# Patient Record
Sex: MALE | Race: WHITE | NOT HISPANIC OR LATINO | Employment: FULL TIME | URBAN - METROPOLITAN AREA
[De-identification: names, ages, dates, MRNs, and addresses within clinical notes are randomized per-mention and may not be internally consistent; named-entity substitution may affect disease eponyms.]

---

## 2023-09-27 ENCOUNTER — HOSPITAL ENCOUNTER (EMERGENCY)
Facility: HOSPITAL | Age: 73
Discharge: HOME | End: 2023-09-27
Attending: EMERGENCY MEDICINE
Payer: OTHER MISCELLANEOUS

## 2023-09-27 ENCOUNTER — APPOINTMENT (OUTPATIENT)
Dept: RADIOLOGY | Facility: HOSPITAL | Age: 73
End: 2023-09-27
Payer: OTHER MISCELLANEOUS

## 2023-09-27 ENCOUNTER — APPOINTMENT (EMERGENCY)
Dept: RADIOLOGY | Facility: HOSPITAL | Age: 73
End: 2023-09-27
Payer: OTHER MISCELLANEOUS

## 2023-09-27 VITALS
DIASTOLIC BLOOD PRESSURE: 79 MMHG | OXYGEN SATURATION: 96 % | BODY MASS INDEX: 35 KG/M2 | TEMPERATURE: 98 F | SYSTOLIC BLOOD PRESSURE: 163 MMHG | WEIGHT: 250 LBS | HEART RATE: 63 BPM | RESPIRATION RATE: 18 BRPM | HEIGHT: 71 IN

## 2023-09-27 DIAGNOSIS — S43.004A DISLOCATION OF RIGHT SHOULDER JOINT, INITIAL ENCOUNTER: Primary | ICD-10-CM

## 2023-09-27 PROCEDURE — 73110 X-RAY EXAM OF WRIST: CPT

## 2023-09-27 PROCEDURE — 99285 EMERGENCY DEPT VISIT HI MDM: CPT | Performed by: EMERGENCY MEDICINE

## 2023-09-27 PROCEDURE — 73020 X-RAY EXAM OF SHOULDER: CPT

## 2023-09-27 PROCEDURE — 99283 EMERGENCY DEPT VISIT LOW MDM: CPT

## 2023-09-27 PROCEDURE — 23650 CLTX SHO DSLC W/MNPJ WO ANES: CPT | Performed by: EMERGENCY MEDICINE

## 2023-09-27 PROCEDURE — 73030 X-RAY EXAM OF SHOULDER: CPT

## 2023-09-27 PROCEDURE — 99152 MOD SED SAME PHYS/QHP 5/>YRS: CPT | Performed by: EMERGENCY MEDICINE

## 2023-09-27 RX ORDER — ETOMIDATE 2 MG/ML
10 INJECTION INTRAVENOUS ONCE
Status: COMPLETED | OUTPATIENT
Start: 2023-09-27 | End: 2023-09-27

## 2023-09-27 RX ORDER — PROPOFOL 10 MG/ML
100 INJECTION, EMULSION INTRAVENOUS ONCE
Status: COMPLETED | OUTPATIENT
Start: 2023-09-27 | End: 2023-09-27

## 2023-09-27 RX ORDER — HYDROCODONE BITARTRATE AND ACETAMINOPHEN 5; 325 MG/1; MG/1
1 TABLET ORAL EVERY 6 HOURS PRN
Qty: 20 TABLET | Refills: 0 | Status: SHIPPED | OUTPATIENT
Start: 2023-09-27

## 2023-09-27 RX ORDER — PROPOFOL 10 MG/ML
INJECTION, EMULSION INTRAVENOUS
Status: COMPLETED
Start: 2023-09-27 | End: 2023-09-27

## 2023-09-27 RX ADMIN — PROPOFOL 100 MG: 10 INJECTION, EMULSION INTRAVENOUS at 18:28

## 2023-09-27 RX ADMIN — ETOMIDATE 10 MG: 2 INJECTION INTRAVENOUS at 18:22

## 2023-09-27 RX ADMIN — ETOMIDATE 10 MG: 2 INJECTION INTRAVENOUS at 18:18

## 2023-09-27 RX ADMIN — PROPOFOL 100 MG: 10 INJECTION, EMULSION INTRAVENOUS at 18:19

## 2023-09-27 NOTE — ED PROVIDER NOTES
History  Chief Complaint   Patient presents with   • Shoulder Injury     Tripped from standing, landing on L shoulder onto a steel rack and press. Reports arm got stuck in upward position prior to falling again. PNS intact. Shoulder deformity noted. Also c/o L wrist pain. 82 yo male with sudden onset R shoulder pain after trip and fall and tried to catch self with outstretched and flexed and abducted RUE. Unable to move shoulder due to pain. Also c/o acute on chronic mild L wrist pain. No head strike or headache or neurologic complaints. No neck pain. No cp, sob, abd pain, back pain or lower extremity pain. Additional history from daughter at bedside. None       Past Medical History:   Diagnosis Date   • CAD (coronary artery disease)    • CHF (congestive heart failure) ([address])    • Heart attack ([address])     2016   • Hypertension        Past Surgical History:   Procedure Laterality Date   • APPENDECTOMY     • COSMETIC SURGERY      "face re built"   • HERNIA REPAIR         History reviewed. No pertinent family history. I have reviewed and agree with the history as documented. E-Cigarette/Vaping     E-Cigarette/Vaping Substances     Social History     Tobacco Use   • Smoking status: Never   • Smokeless tobacco: Never   Substance Use Topics   • Alcohol use: Never   • Drug use: Never       Review of Systems   Musculoskeletal: Positive for arthralgias. Physical Exam  Physical Exam  Vitals and nursing note reviewed. Constitutional:       General: He is not in acute distress. Appearance: Normal appearance. He is well-developed. He is not ill-appearing, toxic-appearing or diaphoretic. HENT:      Head: Normocephalic and atraumatic. Eyes:      Conjunctiva/sclera: Conjunctivae normal.      Pupils: Pupils are equal, round, and reactive to light. Neck:      Vascular: No JVD. Cardiovascular:      Rate and Rhythm: Normal rate and regular rhythm. Heart sounds: Normal heart sounds.  No murmur heard.     No friction rub. No gallop. Pulmonary:      Effort: Pulmonary effort is normal. No respiratory distress. Breath sounds: Normal breath sounds. No stridor. No wheezing or rales. Abdominal:      General: There is no distension. Palpations: Abdomen is soft. Tenderness: There is no abdominal tenderness. There is no guarding or rebound. Musculoskeletal:         General: Deformity present. No tenderness. Cervical back: Normal range of motion and neck supple. No rigidity or tenderness. Comments: R shoulder dislocated anteriorly. Skin:     General: Skin is warm and dry. Capillary Refill: Capillary refill takes less than 2 seconds. Coloration: Skin is not jaundiced or pale. Findings: No bruising, erythema, lesion or rash. Neurological:      General: No focal deficit present. Mental Status: He is alert and oriented to person, place, and time. Cranial Nerves: No cranial nerve deficit. Sensory: No sensory deficit. Motor: No weakness or abnormal muscle tone.       Coordination: Coordination normal.      Gait: Gait normal.         Vital Signs  ED Triage Vitals   Temperature Pulse Respirations Blood Pressure SpO2   09/27/23 1603 09/27/23 1603 09/27/23 1603 09/27/23 1603 09/27/23 1603   (!) 97.1 °F (36.2 °C) 69 18 160/75 97 %      Temp Source Heart Rate Source Patient Position - Orthostatic VS BP Location FiO2 (%)   09/27/23 1914 09/27/23 1817 09/27/23 1817 09/27/23 1817 --   Oral Monitor Lying Left arm       Pain Score       09/27/23 1608       7           Vitals:    09/27/23 1845 09/27/23 1850 09/27/23 1855 09/27/23 1900   BP: (!) 181/81 160/61 164/78 163/79   Pulse: 73 72 75 63   Patient Position - Orthostatic VS: Lying   Sitting         Visual Acuity  Visual Acuity    Flowsheet Row Most Recent Value   L Pupil Size (mm) 3   R Pupil Size (mm) 3          ED Medications  Medications   etomidate (AMIDATE) 2 mg/mL injection 10 mg (10 mg Intravenous Given 9/27/23 1818)   etomidate (AMIDATE) 2 mg/mL injection 10 mg (10 mg Intravenous Given 9/27/23 1822)   propofol (DIPRIVAN) 200 MG/20ML bolus injection 100 mg (100 mg Intravenous Given by Other 9/27/23 1828)   propofol (DIPRIVAN) 200 MG/20ML bolus injection 100 mg (100 mg Intravenous Given 9/27/23 1819)       Diagnostic Studies  Results Reviewed     None                 XR shoulder 1 vw RIGHT POST REDUCTION   ED Interpretation by Benita Mack MD (09/27 1853)   Successful reduction. XR shoulder 2+ views RIGHT   ED Interpretation by Benita Mack MD (09/27 1653)   Anterior dislocation. XR wrist 3+ views LEFT   ED Interpretation by Benita Mack MD (09/27 1653)   Normal study.                   Procedures  Pre-Procedural Sedation    Performed by: Benita Mack MD  Authorized by: Benita Mack MD    Consent:     Consent obtained:  Verbal    Consent given by:  Patient    Alternatives discussed:  Analgesia without sedation, anxiolysis and regional anesthesia  Indications:     Procedure necessitating sedation performed by:  Physician performing sedation    Intended level of sedation:  Moderate (conscious sedation)  Pre-sedation assessment:     NPO status caution: unable to specify NPO status      Neck mobility: normal      Mallampati score:  II - soft palate, uvula, fauces visible    Pre-sedation assessments completed and reviewed: airway patency, cardiovascular function, hydration status, mental status, nausea/vomiting, pain level and respiratory function      History of difficult intubation: no      Pre-sedation assessment completed:  9/27/2023 4:55 PM    Procedural Sedation    Date/Time: 9/27/2023 6:16 PM    Performed by: Benita Mack MD  Authorized by: Benita Mack MD    Immediate pre-procedure details:     Reassessment: Patient reassessed immediately prior to procedure      Reviewed: vital signs and relevant labs/tests      Verified: bag valve mask available, emergency equipment available, intubation equipment available, IV patency confirmed, oxygen available and suction available    Procedure details (see MAR for exact dosages):     Sedation start time:  9/27/2023 6:16 PM    Preoxygenation:  Nasal cannula    Sedation:  Propofol    Intra-procedure monitoring:  Blood pressure monitoring, cardiac monitor, continuous pulse oximetry, continuous capnometry, frequent LOC assessments and frequent vital sign checks    Intra-procedure events: none      Sedation end time:  9/27/2023 6:31 PM    Total sedation time (minutes):  15  Post-procedure details:     Post-sedation assessment completed:  9/27/2023 6:38 PM    Attendance: Constant attendance by certified staff until patient recovered      Recovery: Patient returned to pre-procedure baseline      Post-sedation assessments completed and reviewed: airway patency, cardiovascular function, hydration status, mental status, nausea/vomiting and respiratory function      Patient tolerance: Tolerated well, no immediate complications  Orthopedic injury treatment    Date/Time: 9/27/2023 6:39 PM    Performed by: Diana Lopez MD  Authorized by: Diana Lopez MD    Patient Location:  ED  [place] Protocol:  Consent: Verbal consent obtained. Risks and benefits: risks, benefits and alternatives were discussed  Consent given by: patient      Injury location:  Shoulder  Neurovascular status: Neurovascularly intact    Distal perfusion: normal    Neurological function: normal    Range of motion: reduced    Sedation type:   Moderate (conscious) sedation (See separate Procedural Sedation form)  Skeletal traction used?: Yes    Immobilization:  Sling  Neurovascular status: Neurovascularly intact    Distal perfusion: normal    Neurological function: normal    Range of motion: normal    Patient tolerance:  Patient tolerated the procedure well with no immediate complications      Conscious Sedation Assessment    Flowsheet Row Classification Score   ASA Scale Assessment 2-Mild to moderate systemic disease, medically well controlled, with no functional limitation filed at 09/27/2023 1817             ED Course  ED Course as of 09/27/23 2046   Wed Sep 27, 2023   1852 Reexamined. Awake. Alert. Notes significant improvement in R shoulder pain. 8224 I have reasonably determined that electronically prescribing a controlled substance would be impractical for the patient to obtain the controlled substance prescribed by electronic prescription or would cause an untimely delay resulting in an adverse impact on the patient's medical condition. Identification of Seniors at [facility] Most Recent Value   (ISAR) Identification of Seniors at Risk    Before the illness or injury that brought you to the Emergency, did you need someone to help you on a regular basis? 0 Filed at: 09/27/2023 1607   In the last 24 hours, have you needed more help than usual? 0 Filed at: 09/27/2023 1607   Have you been hospitalized for one or more nights during the past 6 months? 0 Filed at: 09/27/2023 1607   In general, do you see well? 0 Filed at: 09/27/2023 1607   In general, do you have serious problems with your memory? 0 Filed at: 09/27/2023 1607   Do you take more than three different medications every day? 0 Filed at: 09/27/2023 1607   ISAR Score 0 Filed at: 09/27/2023 1607                      SBIRT 20yo+    Flowsheet Row Most Recent Value   Initial Alcohol Screen: US AUDIT-C     1. How often do you have a drink containing alcohol? 0 Filed at: 09/27/2023 1854   2. How many drinks containing alcohol do you have on a typical day you are drinking? 0 Filed at: 09/27/2023 1854   3a. Male UNDER 65: How often do you have five or more drinks on one occasion? 0 Filed at: 09/27/2023 1608   3b. FEMALE Any Age, or MALE 65+: How often do you have 4 or more drinks on one occassion? 0 Filed at: 09/27/2023 1854   Audit-C Score 0 Filed at: 09/27/2023 1854   SOPHIE: How many times in the past year have you. ..     Used an illegal drug or used a prescription medication for non-medical reasons? Never Filed at: 09/27/2023 1854                    MDM    Disposition  Final diagnoses:   Dislocation of right shoulder joint, initial encounter     Time reflects when diagnosis was documented in both MDM as applicable and the Disposition within this note     Time User Action Codes Description Comment    9/27/2023  6:53 PM Oscar Preston Add [S43.004A] Dislocation of right shoulder joint, initial encounter       ED Disposition     ED Disposition   Discharge    Condition   Stable    Date/Time   Wed Sep 27, 2023  6:53 PM    [facility] discharge to home/self care.                Follow-up Information    None         Discharge Medication List as of 9/27/2023  6:54 PM      START taking these medications    Details   HYDROcodone-acetaminophen (Norco) 5-325 mg per tablet Take 1 tablet by mouth every 6 (six) hours as needed for pain for up to 20 doses Max Daily Amount: 4 tablets, Starting Wed 9/27/2023, Print                 PDMP Review     None          ED Provider  Electronically Signed by           Marychuy Gerber MD  09/27/23 2046

## 2023-09-27 NOTE — Clinical Note
Rigoberto Sears was seen and treated in our emergency department on 9/27/2023. Occupational Medicine Follow Up Within 24 hours    No work until cleared by Family Doctor/Orthopedics        Diagnosis: R shoulder dislocation    Tory Barrier  may return to gym class or sports after being cleared by physician. He may return on this date: If you have any questions or concerns, please don't hesitate to call.       Dedra Silver RN    ______________________________           _______________          _______________  [facility] Representative                              Date                                Time

## 2023-10-05 ENCOUNTER — APPOINTMENT (OUTPATIENT)
Dept: RADIOLOGY | Facility: CLINIC | Age: 73
End: 2023-10-05
Payer: COMMERCIAL

## 2023-10-05 VITALS
DIASTOLIC BLOOD PRESSURE: 68 MMHG | BODY MASS INDEX: 36.03 KG/M2 | SYSTOLIC BLOOD PRESSURE: 119 MMHG | HEART RATE: 65 BPM | HEIGHT: 71 IN | WEIGHT: 257.4 LBS

## 2023-10-05 DIAGNOSIS — M25.511 ACUTE PAIN OF RIGHT SHOULDER: Primary | ICD-10-CM

## 2023-10-05 DIAGNOSIS — S43.004A DISLOCATION OF RIGHT SHOULDER JOINT, INITIAL ENCOUNTER: ICD-10-CM

## 2023-10-05 PROCEDURE — 73030 X-RAY EXAM OF SHOULDER: CPT

## 2023-10-05 PROCEDURE — 99204 OFFICE O/P NEW MOD 45 MIN: CPT | Performed by: ORTHOPAEDIC SURGERY

## 2023-10-05 RX ORDER — ALBUTEROL SULFATE 2.5 MG/3ML
SOLUTION RESPIRATORY (INHALATION)
COMMUNITY

## 2023-10-05 RX ORDER — MULTIVITAMIN WITH IRON
250 TABLET ORAL DAILY
COMMUNITY

## 2023-10-05 RX ORDER — POTASSIUM CHLORIDE 1.5 G/1.58G
20 POWDER, FOR SOLUTION ORAL
COMMUNITY

## 2023-10-05 RX ORDER — POTASSIUM CHLORIDE 20 MEQ/1
20 TABLET, EXTENDED RELEASE ORAL DAILY
COMMUNITY
Start: 2023-07-14

## 2023-10-05 RX ORDER — METOPROLOL TARTRATE 50 MG/1
50 TABLET, FILM COATED ORAL DAILY
COMMUNITY
Start: 2023-09-28

## 2023-10-05 RX ORDER — ALBUTEROL SULFATE 90 UG/1
AEROSOL, METERED RESPIRATORY (INHALATION)
COMMUNITY

## 2023-10-05 RX ORDER — ATORVASTATIN CALCIUM 40 MG/1
40 TABLET, FILM COATED ORAL DAILY
COMMUNITY
Start: 2023-09-27

## 2023-10-05 RX ORDER — ISOSORBIDE MONONITRATE 60 MG/1
60 TABLET, EXTENDED RELEASE ORAL DAILY
COMMUNITY
Start: 2023-09-23

## 2023-10-05 RX ORDER — ISOSORBIDE MONONITRATE 60 MG/1
60 TABLET, EXTENDED RELEASE ORAL
COMMUNITY

## 2023-10-05 RX ORDER — FUROSEMIDE 20 MG/1
40 TABLET ORAL 2 TIMES DAILY
COMMUNITY
Start: 2023-08-14

## 2023-10-05 RX ORDER — BUDESONIDE AND FORMOTEROL FUMARATE DIHYDRATE 160; 4.5 UG/1; UG/1
2 AEROSOL RESPIRATORY (INHALATION) 2 TIMES DAILY
COMMUNITY
Start: 2023-08-01

## 2023-10-05 NOTE — LETTER
October 5, 2023     Patient: Mayelin Chavez  YOB: 1950  Date of Visit: 10/5/2023      To Whom it May Concern:    Va Nguyen is under my professional care. Madhu Berman was seen in my office on 10/5/2023. Madhu Berman may not return to work at this time. He will follow up in office in 6 weeks for reevaluation and new work note will be provided at that time. If you have any questions or concerns, please don't hesitate to call.          Sincerely,          Les Like, DO        CC: No Recipients

## 2023-10-05 NOTE — PROGRESS NOTES
Patient Name:  Beverly Payne  MRN:  [id number]    [address] [facility]     1. Acute pain of right shoulder    2. Dislocation of right shoulder joint, initial encounter  -     Ambulatory Referral to Orthopedic Surgery  -     XR shoulder 2+ vw right; Future; Expected date: 10/05/2023  -     Ambulatory Referral to Physical Therapy; Future      Right shoulder pain s/p anterior shoulder dislocation on 09/27/2023  · X-rays reviewed in office today with patient  · Discussed anterior shoulder dislocations, possible concomitant rotator cuff tears   · Discussed and recommended moving forward with nonoperative treatment  · Advised patient to continue to use the sling for 1 week, removing 3 times daily to perform pendulums and elbow ROM exercises. · Script for outpatient PT placed today to work on passive and active range of motion. Advised patient to avoid provocative positions including abduction and external rotation to decrease risk of repeat dislocation. · If patient's range of motion does not improve with outpatient PT and pain persists, can consider additional imaging. · In regards to work, strongly recommended patient to avoid operating machines at this time. Provided work note to stay out of work until follow up appointment. · Follow up in 6 weeks for reevaluation. Chief Complaint     Right shoulder pain    History of the Present Illness     Beverly Payne is a RHD 81 y.o. male with Right shoulder pain after a trip and fall on 09/27/2023 onto outstretched Right upper extremity while at work. Patient states he eventually landed with his arm overhead. Patient had immediate pain and was unable to perform range of motion of the shoulder. He reported to the ED for evaluation and he sustained Right shoulder dislocation which was subsequently reduced. Today, patient reports he is feeling a little better since initial injury. He has been using the sling since the injury.  Patient is a  for his occupation, pushes, pulls and lifts heavy items. He denies previous dislocation or surgery of Right upper extremity. Review of Systems     Review of Systems   Constitutional: Negative for chills and fever. HENT: Negative for ear pain and sore throat. Eyes: Negative for pain and visual disturbance. Respiratory: Negative for cough and shortness of breath. Cardiovascular: Negative for chest pain and palpitations. Gastrointestinal: Negative for abdominal pain and vomiting. Genitourinary: Negative for dysuria and hematuria. Musculoskeletal: Negative for arthralgias and back pain. Skin: Negative for color change and rash. Neurological: Negative for seizures and syncope. All other systems reviewed and are negative. Physical Exam     /68   Pulse 65   Ht 5' 11" (1.803 m)   Wt 117 kg (257 lb 6.4 oz)   BMI 35.90 kg/m²     Right Shoulder:   Range of motion of elbow from 10 to approximately 120 degrees  Passive range of motion   30 degrees of forward flexion with pain  The patient is neurovascularly intact distally in the extremity. Eyes:  Anicteric sclerae. Neck:  Supple. Lungs:  Normal respiratory effort. Cardiovascular:  Capillary refill is less than 2 seconds. Skin:  Intact without erythema. Neurologic:  Sensation grossly intact to light touch. Psychiatric:  Mood and affect are appropriate. Data Review     I have personally reviewed pertinent films in PACS, and my interpretation follows:    X-rays taken 10/05/2023 of Right shoulder independently reviewed and demonstrate reduced glenohumeral joint. Minimal to mild glenohumeral degenerative changes. No obvious acute fracture or dislocation noted. X-rays taken 09/27/2023 of Right shoulder demonstrate acute anterior shoulder dislocation. Reduction films performed demonstrating reduced glenohumeral joint.      Past Medical History:   Diagnosis Date   • CAD (coronary artery disease)    • CHF (congestive heart failure) ([address] )    • Heart attack [facility])     2016   • Hypertension        Past Surgical History:   Procedure Laterality Date   • APPENDECTOMY     • COSMETIC SURGERY      "face re built"   • HERNIA REPAIR         Allergies   Allergen Reactions   • Codeine Other (See Comments)     Constipation. • Microplegia Msa-Msg [Plegisol] Palpitations       Current Outpatient Medications on File Prior to Visit   Medication Sig Dispense Refill   • albuterol (2.5 mg/3 mL) 0.083 % nebulizer solution albuterol sulfate 2.5 mg/3 mL (0.083 %) solution for nebulization   inhale 1 vial VIA NEBULIZER, every 4 hours if needed as directed     • albuterol (PROVENTIL HFA,VENTOLIN HFA) 90 mcg/act inhaler albuterol sulfate HFA 90 mcg/actuation aerosol inhaler     • Aspirin 81 MG CAPS Take 81 mg by mouth daily     • atorvastatin (LIPITOR) 40 mg tablet Take 40 mg by mouth daily     • furosemide (LASIX) 20 mg tablet Take 40 mg by mouth 2 (two) times a day     • HYDROcodone-acetaminophen (Norco) 5-325 mg per tablet Take 1 tablet by mouth every 6 (six) hours as needed for pain for up to 20 doses Max Daily Amount: 4 tablets 20 tablet 0   • isosorbide mononitrate (IMDUR) 60 mg 24 hr tablet 60 mg     • isosorbide mononitrate (IMDUR) 60 mg 24 hr tablet Take 60 mg by mouth daily     • Magnesium 250 MG TABS Take 250 mg by mouth daily     • metoprolol tartrate (LOPRESSOR) 50 mg tablet Take 50 mg by mouth daily     • potassium chloride (K-DUR,KLOR-CON) 20 mEq tablet Take 20 mEq by mouth daily     • Symbicort 160-4.5 MCG/ACT inhaler Inhale 2 puffs 2 (two) times a day     • Melatonin 5 MG CAPS melatonin 5 mg capsule   Take 3 capsules by oral route at bedtime. (Patient not taking: Reported on 10/5/2023)     • potassium chloride (KLOR-CON) 20 mEq packet Take 20 mEq by mouth       No current facility-administered medications on file prior to visit.        Social History     Tobacco Use   • Smoking status: Never   • Smokeless tobacco: Never   Substance Use Topics   • Alcohol use: Never • Drug use: Never       History reviewed. No pertinent family history.           Procedures Performed     Procedures  None       Sueanne Duane, DO

## 2023-10-09 ENCOUNTER — TELEPHONE (OUTPATIENT)
Age: 73
End: 2023-10-09

## 2023-10-09 NOTE — TELEPHONE ENCOUNTER
Caller: [id number]    Doctor: Unknown [place]    Reason for call: Stewart Casas called for the PT referral to be sent to [facility].   stated that Effie Holden was informed that  schedules the PT so Stewart Casas stated to fax it through [facility]    Call back#: [phone]

## 2023-10-20 ENCOUNTER — TELEPHONE (OUTPATIENT)
Age: 73
End: 2023-10-20

## 2023-10-20 NOTE — TELEPHONE ENCOUNTER
Caller: [facility]    Doctor: Jesse Nixon     Reason for call: Sent over a job description for patient, would like to know if you can respond to this form as to if you feel that the patient can do these accomodations     Call back#: [phone]    Fax # [phone]

## 2023-10-26 NOTE — TELEPHONE ENCOUNTER
Caller: Kelin Sanchez    Doctor: Sophie Hirsch    Reason for call: Madhu Fraser is checking on status of form that was faxed over.     Call back#: [phone]  Fax: [phone]

## 2023-10-31 NOTE — TELEPHONE ENCOUNTER
Caller: Johnny Sanchez     Doctor: Usha Rosario     Reason for call: Cassie Dey is checking on status of form that was faxed over.      Call back#: [phone]  Fax: [phone]

## 2023-11-16 ENCOUNTER — TELEPHONE (OUTPATIENT)
Age: 73
End: 2023-11-16

## 2023-11-16 VITALS
HEART RATE: 86 BPM | SYSTOLIC BLOOD PRESSURE: 133 MMHG | BODY MASS INDEX: 35.98 KG/M2 | HEIGHT: 71 IN | WEIGHT: 257 LBS | DIASTOLIC BLOOD PRESSURE: 73 MMHG

## 2023-11-16 DIAGNOSIS — S43.004A DISLOCATION OF RIGHT SHOULDER JOINT, INITIAL ENCOUNTER: ICD-10-CM

## 2023-11-16 DIAGNOSIS — M24.811 INTERNAL DERANGEMENT OF RIGHT SHOULDER: Primary | ICD-10-CM

## 2023-11-16 PROCEDURE — 99214 OFFICE O/P EST MOD 30 MIN: CPT | Performed by: ORTHOPAEDIC SURGERY

## 2023-11-16 NOTE — LETTER
November 16, 2023     Patient: Beverly Payne  YOB: 1950  Date of Visit: 11/16/2023      To Whom it May Concern:    Catrina Garland is under my professional care. Heather Ford was seen in my office on 11/16/2023. Heather Ford may return to work with the following restrictions:    -No pushing, pulling  -No lifting or overhead lifting  -No climbing     He will follow up in office after MRI of Right shoulder resulted for reevaluation. New work note may be placed at that time. If you have any questions or concerns, please don't hesitate to call.          Sincerely,          Yecenia Ramirez,         CC: No Recipients

## 2023-11-16 NOTE — PROGRESS NOTES
Patient Name:  Newton Hollingsworth  MRN:  [id number]    [address] [facility]     1. Internal derangement of right shoulder  -     MRI shoulder right wo contrast; Future; Expected date: 11/16/2023  -     Ambulatory Referral to Physical Therapy; Future    2. Dislocation of right shoulder joint, initial encounter      Approximately 7 weeks s/p Right shoulder dislocation  In light of patient's persistent shoulder weakness, decreased range of motion and significant pain, will move forward with Right shoulder MRI to evaluate rotator cuff and cartilage surfaces. Will provide Ativan prior to MRI. Advised patient he will need a ride to and from the study as this medication will cause drowsiness. In the meantime, advised patient to avoid overhead lifting, pushing, pulling. Work note provided with restrictions, but advised patient he may participate in sedentary job at work if available by employer. Continue outpatient PT to work on improvement of passive range of motion to prevent stiffness. Educated patient about home exercises including wall walks and table slides. Continue OTC medication as needed for pain relief  Follow up after MRI resulted     History of the Present Illness   Newton Hollingsworth is a 72 y.o. male approximately 7 weeks s/p Right shoulder dislocation. Today, patient reports he is doing okay. He has been attending outpatient PT with difficulties performing overhead range of motion. He admits to significant pain with attempted motion. He has not yet been back to work as his job requires him to push, pull and perform overhead duties. Review of Systems     Review of Systems   Constitutional:  Negative for chills and fever. HENT:  Negative for ear pain and sore throat. Eyes:  Negative for pain and visual disturbance. Respiratory:  Negative for cough and shortness of breath. Cardiovascular:  Negative for chest pain and palpitations. Gastrointestinal:  Negative for abdominal pain and vomiting. Genitourinary:  Negative for dysuria and hematuria. Musculoskeletal:  Negative for arthralgias and back pain. Skin:  Negative for color change and rash. Neurological:  Negative for seizures and syncope. All other systems reviewed and are negative. Physical Exam     /73   Pulse 86   Ht 5' 11" (1.803 m)   Wt 117 kg (257 lb)   BMI 35.84 kg/m²     Right Shoulder: Active range of motion   30 degrees forward flexion  20 degrees abduction  10 degrees external rotation   SI joint internal rotation    Passive range of motion   70 degrees of forward flexion   Supraspinatus testing 3/5  Infraspinatus testing 3/5  Subscapularis testing 3/5  The patient is neurovascularly intact distally in the extremity. Data Review     I have personally reviewed pertinent films in PACS, and my interpretation follows. X-rays taken 10/05/2023 of Right shoulder independently reviewed and demonstrate reduced glenohumeral joint. Minimal to mild glenohumeral degenerative changes. No obvious acute fracture or dislocation noted. X-rays taken 09/27/2023 of Right shoulder demonstrate acute anterior shoulder dislocation. Reduction films performed demonstrating reduced glenohumeral joint.      Social History     Tobacco Use    Smoking status: Never    Smokeless tobacco: Never   Substance Use Topics    Alcohol use: Never    Drug use: Never           Procedures  None     Yecenia Ramirez PA-C

## 2023-11-16 NOTE — TELEPHONE ENCOUNTER
Caller: patient    Doctor: Teresa Noe    Reason for call: please print out MRI order and fax to Cookie Justin @ [phone]    Call back#: [phone]

## 2023-11-16 NOTE — TELEPHONE ENCOUNTER
Caller: saurabh     Doctor/Office: Niranjan    What needs to be faxed: PT note     ATTN to: Moses    Fax#: [phone]       Documents were successfully e-faxed

## 2023-11-24 ENCOUNTER — TELEPHONE (OUTPATIENT)
Age: 73
End: 2023-11-24

## 2023-11-24 NOTE — TELEPHONE ENCOUNTER
Caller: Patient     Doctor: Darian Puentes     Reason for call: Patient will need something called in for claustrophobia to have the right shoulder MRI. He is waiting for approval from [facility] to schedule MRI appt. He states that he is still having significant swelling in the right shoulder.     Please send to Vignesh Lloyd, [address] [facility], # [phone], fax# [phone]    Call back#: [phone]

## 2023-11-27 DIAGNOSIS — F40.240 CLAUSTROPHOBIA: Primary | ICD-10-CM

## 2023-11-27 RX ORDER — LORAZEPAM 1 MG/1
TABLET ORAL
Qty: 2 TABLET | Refills: 0 | Status: SHIPPED | OUTPATIENT
Start: 2023-11-27

## 2023-11-27 NOTE — TELEPHONE ENCOUNTER
Called and spoke w/wife Abhay Miller and relayed JIM Sands's msg to her. She states that they are waiting for the Worker's Comp to tell them where to have MRI done.

## 2023-12-12 ENCOUNTER — TELEPHONE (OUTPATIENT)
Age: 73
End: 2023-12-12

## 2023-12-12 NOTE — TELEPHONE ENCOUNTER
Caller: Giuliana Peoples    Doctor: Harry Muller    Reason for call: Patient was unable to complete the MRI on 12/12/2023. MRI was too small for patient and he is claustrophobic. Patient is requesting an open MRI.   Please advise     Call back#: [phone] land line

## 2023-12-13 ENCOUNTER — TELEPHONE (OUTPATIENT)
Age: 73
End: 2023-12-13

## 2023-12-13 NOTE — TELEPHONE ENCOUNTER
Niranjan    Pt was scheduled for an MRI yesterday however was unable to tolerate the closed MRI, was sedated and did not help. Also has a heart stent     Would like to know if he can have another script for an open mri. Will be looking to go to one at the [address] [place] [address] area where he was told they have a compatible machine.      Pt states he has gone back to light duty work as his employer has taken him back, but he is having significant discomfort     Callback: [phone]

## 2023-12-15 ENCOUNTER — TELEPHONE (OUTPATIENT)
Age: 73
End: 2023-12-15

## 2023-12-15 NOTE — TELEPHONE ENCOUNTER
Caller: Jovon Talamantess- wife    Doctor/Office: Rolanda    #: [phone]      What needs to be faxed: MRI script    ATTN to: [facility]    Fax#: [phone]

## 2023-12-18 ENCOUNTER — TELEPHONE (OUTPATIENT)
Dept: OBGYN CLINIC | Facility: MEDICAL CENTER | Age: 73
End: 2023-12-18

## 2023-12-18 DIAGNOSIS — F40.240 CLAUSTROPHOBIA: ICD-10-CM

## 2023-12-18 RX ORDER — LORAZEPAM 1 MG/1
TABLET ORAL
Qty: 2 TABLET | Refills: 0 | Status: SHIPPED | OUTPATIENT
Start: 2023-12-18

## 2023-12-18 NOTE — TELEPHONE ENCOUNTER
Caller: Johana     Doctor: Dr Ureña     Reason for call: The patient's wife is calling to let you know that Abundio will have his MRI on 1/224 at [facility]. She is asking for something that he will be able to relax for claustrophobia to have the right shoulder MRI. Ativan is what he had last MRI.      Please send to [facility] [address], [place] [address], ph# [phone], fax# [phone]       Call back#: [phone]

## 2024-01-08 ENCOUNTER — TELEPHONE (OUTPATIENT)
Age: 74
End: 2024-01-08

## 2024-01-08 NOTE — TELEPHONE ENCOUNTER
Caller: Jonah    Doctor: Niranjan    Reason for call: please print out 11/16/23 ov note and work note, fax to # [phone]    Call back#: [phone]

## 2024-01-25 ENCOUNTER — TELEPHONE (OUTPATIENT)
Dept: OBGYN CLINIC | Facility: HOSPITAL | Age: 74
End: 2024-01-25

## 2024-01-25 ENCOUNTER — OFFICE VISIT (OUTPATIENT)
Dept: OBGYN CLINIC | Facility: CLINIC | Age: 74
End: 2024-01-25
Payer: OTHER MISCELLANEOUS

## 2024-01-25 ENCOUNTER — TELEPHONE (OUTPATIENT)
Dept: OBGYN CLINIC | Facility: CLINIC | Age: 74
End: 2024-01-25

## 2024-01-25 VITALS
HEART RATE: 68 BPM | WEIGHT: 269.8 LBS | BODY MASS INDEX: 37.77 KG/M2 | HEIGHT: 71 IN | SYSTOLIC BLOOD PRESSURE: 115 MMHG | DIASTOLIC BLOOD PRESSURE: 70 MMHG

## 2024-01-25 DIAGNOSIS — S43.014D ANTERIOR DISLOCATION OF RIGHT SHOULDER, SUBSEQUENT ENCOUNTER: ICD-10-CM

## 2024-01-25 DIAGNOSIS — S46.011D TRAUMATIC COMPLETE TEAR OF RIGHT ROTATOR CUFF, SUBSEQUENT ENCOUNTER: Primary | ICD-10-CM

## 2024-01-25 PROCEDURE — 99214 OFFICE O/P EST MOD 30 MIN: CPT | Performed by: ORTHOPAEDIC SURGERY

## 2024-01-25 RX ORDER — ERGOCALCIFEROL 1.25 MG/1
50000 CAPSULE ORAL WEEKLY
COMMUNITY
Start: 2023-11-28

## 2024-01-25 RX ORDER — FAMOTIDINE 20 MG/1
20 TABLET, FILM COATED ORAL 2 TIMES DAILY
COMMUNITY
Start: 2023-12-26

## 2024-01-25 NOTE — TELEPHONE ENCOUNTER
Called pts PCP to set up appt for pre-op clearance and evaluation of right upper extremity tremor.  LM to call me back at my direct number, [phone].

## 2024-01-25 NOTE — PROGRESS NOTES
Patient Name:  Abundio Pederson  MRN:  [id number]    Assessment & Plan     1. Traumatic complete tear of right rotator cuff, subsequent encounter  -     CT shoulder right blue print; Future; Expected date: 01/25/2024    2. Anterior dislocation of right shoulder, subsequent encounter  -     CT shoulder right blue print; Future; Expected date: 01/25/2024        Right shoulder full thickness rotator cuff tears s/p anterior shoulder dislocation sustained 09/27/2023, Right upper extremity muscle spasm vs new onset tremor  MRI reviewed with patient   In depth conversation had with patient regarding nonoperative vs surgical management of Right shoulder  Nonoperative management including CSI, continued outpatient PT, OTC topical and oral analgesics.  Surgical intervention discussed including arthroscopic rotator cuff repair vs reverse shoulder arthroplasty. Advised patient his massive, retracted rotator cuff tears would unlikely be repaired arthroscopically secondary to level of retraction. Due to his age and activity level, reverse shoulder arthroplasty would also be most likely to give most predictable pain relief and functional improvement.   Due to persistent pain, weakness and overall functional limitation with massive retracted rotator cuff tears, I have recommended surgical intervention by means of reverse shoulder arthroplasty. Educated patient on mechanics of reverse shoulder arthroplasty and use of deltoid for range of motion. Risks of surgery, including but not limited to, infection, nerve and blood vessel injury, periprosthetic fracture, dislocation, DVT/PE, loosening, anesthesia complication, need for subsequent surgery.  At this time, patient wishes to move forward with Right reverse shoulder arthroplasty. Will obtain CT blueprint of Right shoulder and he will follow up once resulted for consent and further discussion of surgical intervention.   He will require PCP evaluation preoperatively in regards to Right  "upper extremity tremor    History of the Present Illness   Abundio Pederson is a 73 y.o. male with Right shoulder dislocation sustained on 09/27/2023. Today, patient reports the shoulder is \"not good\". He feels as if his arm is heavy when he is walking. 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 Patient admits he has had a tremor in his Right upper extremity since the accident and his therapist has discussed it with him while at therapy. He continues outpatient PT and does not think it is working anymore.         Review of Systems     Review of Systems   Constitutional:  Negative for chills and fever.   HENT:  Negative for ear pain and sore throat.    Eyes:  Negative for pain and visual disturbance.   Respiratory:  Negative for cough and shortness of breath.    Cardiovascular:  Negative for chest pain and palpitations.   Gastrointestinal:  Negative for abdominal pain and vomiting.   Genitourinary:  Negative for dysuria and hematuria.   Musculoskeletal:  Negative for arthralgias and back pain.   Skin:  Negative for color change and rash.   Neurological:  Negative for seizures and syncope.   All other systems reviewed and are negative.      Physical Exam     /70   Pulse 68   Ht 5' 11\" (1.803 m)   Wt 122 kg (269 lb 12.8 oz)   BMI 37.63 kg/m²     Right Shoulder:   Active range of motion   20 degrees forward flexion with pain  30 degrees abduction with pain  5 degrees external rotation   SI joint internal rotation    Passive range of motion   80 degrees of forward flexion     There is no tenderness present over the shoulder.   Supraspinatus testing 2/5  Infraspinatus testing 3/5  Subscapularis testing 4-/5  The patient is neurovascularly intact distally in the extremity.      Data Review     I have personally reviewed pertinent films in PACS, and my interpretation follows.    MRI performed of Right shoulder demonstrates full thickness tearing of " supraspinatus, infraspinatus and subscapularis tendons with retraction noted and mild to moderate atrophy.     X-rays taken 10/05/2023 of Right shoulder independently reviewed and demonstrate reduced glenohumeral joint. Minimal to mild glenohumeral degenerative changes. No obvious acute fracture or dislocation noted.      X-rays taken 09/27/2023 of Right shoulder demonstrate acute anterior shoulder dislocation. Reduction films performed demonstrating reduced glenohumeral joint.     Social History     Tobacco Use    Smoking status: Never    Smokeless tobacco: Never   Vaping Use    Vaping status: Never Used   Substance Use Topics    Alcohol use: Never    Drug use: Never           Procedures  None    Kanchan Sands PA-C

## 2024-01-25 NOTE — TELEPHONE ENCOUNTER
Pts wife called with the correct number, [phone],  for Diane Hickey PA-C.  I called and left a message asking for a call back to schedule appt for pt.

## 2024-01-25 NOTE — TELEPHONE ENCOUNTER
Coreen from PCP office called back and she said before the pt would be cleared by them, he would have to see Pulmonology and Cardiology first due to his medical history. She asked me to fax a Medical Clearance request to [phone].

## 2024-01-26 ENCOUNTER — TELEPHONE (OUTPATIENT)
Dept: OBGYN CLINIC | Facility: HOSPITAL | Age: 74
End: 2024-01-26

## 2024-01-26 ENCOUNTER — TELEPHONE (OUTPATIENT)
Age: 74
End: 2024-01-26

## 2024-01-26 NOTE — TELEPHONE ENCOUNTER
Caller: Johana-wife    Doctor: Niranjan    Reason for call: wife is requesting the cardiac clearance form/referral to be faxed [facility].    Fax # [phone]  ATTN: Dr Mills    Call back#: n/a

## 2024-01-26 NOTE — TELEPHONE ENCOUNTER
Faxed clearance request to PCP, Cardio and Pulmonary.  Pt is scheduled for PCP and Pulmonary.  I am waiting for a call back to schedule Cardio.  Pts wife will let me know if she hears from Cardio as well.  I faxed pts office note from 1/25/24 to PCP as requested.

## 2024-01-26 NOTE — TELEPHONE ENCOUNTER
Caller: XAVIER GARCIA    Doctor/Office: Niranjan RUTHERFORD#: [phone]      What needs to be faxed: OV 1/25/24    ATTN to: Rajat    Fax#: [phone]      Documents were successfully e-faxed

## 2024-01-30 ENCOUNTER — TELEPHONE (OUTPATIENT)
Age: 74
End: 2024-01-30

## 2024-01-30 NOTE — TELEPHONE ENCOUNTER
Caller: [email or web address]    Doctor/Office: Niranjan RUTHERFORD#: n/a      What needs to be faxed: LEONOR 1/25/24    ATTN to:     Fax#: [phone]      Documents were successfully e-faxed

## 2024-02-01 ENCOUNTER — HOSPITAL ENCOUNTER (OUTPATIENT)
Dept: CT IMAGING | Facility: HOSPITAL | Age: 74
End: 2024-02-01
Payer: OTHER MISCELLANEOUS

## 2024-02-01 DIAGNOSIS — S43.014D ANTERIOR DISLOCATION OF RIGHT SHOULDER, SUBSEQUENT ENCOUNTER: ICD-10-CM

## 2024-02-01 DIAGNOSIS — S46.011D TRAUMATIC COMPLETE TEAR OF RIGHT ROTATOR CUFF, SUBSEQUENT ENCOUNTER: ICD-10-CM

## 2024-02-01 PROCEDURE — G1004 CDSM NDSC: HCPCS

## 2024-02-01 PROCEDURE — 73200 CT UPPER EXTREMITY W/O DYE: CPT

## 2024-02-06 ENCOUNTER — TELEPHONE (OUTPATIENT)
Age: 74
End: 2024-02-06

## 2024-02-06 NOTE — TELEPHONE ENCOUNTER
Caller: Rajat/[employer]    Doctor: Niranjan    Reason for call: Questioned if patient had f/u appt scheduled? Advised no    Call back#: [phone]

## 2024-02-13 ENCOUNTER — TELEPHONE (OUTPATIENT)
Age: 74
End: 2024-02-13

## 2024-02-13 NOTE — TELEPHONE ENCOUNTER
Caller: XAVIER Earl     Doctor: Niranjan    Reason for call: Calling to check appointment dates.    Call back#: [phone]

## 2024-02-26 ENCOUNTER — TELEPHONE (OUTPATIENT)
Age: 74
End: 2024-02-26

## 2024-03-14 ENCOUNTER — TELEPHONE (OUTPATIENT)
Age: 74
End: 2024-03-14

## 2024-03-14 NOTE — TELEPHONE ENCOUNTER
Caller: patient wife    Doctor: melina    Reason for call: states that Diane SUAREZ needs PAT paper work filled out  Phone number: [phone]    Call back#: [phone]

## 2024-03-15 NOTE — TELEPHONE ENCOUNTER
Returned pts call, spoke with wife.  I explained to her that once the pt is seen on 3/29/24, Dr. Ureña will order the preop LABS, CXR and schedule a surgery date.  Once the pt has the preop testing done, I will send a copy to his PCP for clearance.      Pt had EKG done at his cardiologist on 2/7/24.    The pt has been cleared by Pulmonary and Cardiology.

## 2024-03-29 ENCOUNTER — OFFICE VISIT (OUTPATIENT)
Dept: OBGYN CLINIC | Facility: CLINIC | Age: 74
End: 2024-03-29
Payer: OTHER MISCELLANEOUS

## 2024-03-29 VITALS
HEART RATE: 78 BPM | HEIGHT: 71 IN | BODY MASS INDEX: 36.15 KG/M2 | DIASTOLIC BLOOD PRESSURE: 76 MMHG | SYSTOLIC BLOOD PRESSURE: 118 MMHG | WEIGHT: 258.2 LBS

## 2024-03-29 DIAGNOSIS — S46.011D TRAUMATIC COMPLETE TEAR OF RIGHT ROTATOR CUFF, SUBSEQUENT ENCOUNTER: Primary | ICD-10-CM

## 2024-03-29 DIAGNOSIS — M19.011 OSTEOARTHRITIS OF GLENOHUMERAL JOINT, RIGHT: ICD-10-CM

## 2024-03-29 DIAGNOSIS — M25.511 RIGHT SHOULDER PAIN, UNSPECIFIED CHRONICITY: ICD-10-CM

## 2024-03-29 DIAGNOSIS — Z01.818 PREOPERATIVE TESTING: Primary | ICD-10-CM

## 2024-03-29 DIAGNOSIS — S43.014D ANTERIOR DISLOCATION OF RIGHT SHOULDER, SUBSEQUENT ENCOUNTER: ICD-10-CM

## 2024-03-29 PROCEDURE — 99214 OFFICE O/P EST MOD 30 MIN: CPT | Performed by: ORTHOPAEDIC SURGERY

## 2024-03-29 RX ORDER — ACETAMINOPHEN 325 MG/1
975 TABLET ORAL ONCE
OUTPATIENT
Start: 2024-03-29 | End: 2024-03-29

## 2024-03-29 RX ORDER — CHLORHEXIDINE GLUCONATE 4 G/100ML
SOLUTION TOPICAL DAILY PRN
OUTPATIENT
Start: 2024-03-29

## 2024-03-29 RX ORDER — CHLORHEXIDINE GLUCONATE ORAL RINSE 1.2 MG/ML
15 SOLUTION DENTAL ONCE
OUTPATIENT
Start: 2024-03-29 | End: 2024-03-29

## 2024-03-29 RX ORDER — CELECOXIB 100 MG/1
200 CAPSULE ORAL ONCE
OUTPATIENT
Start: 2024-03-29 | End: 2024-03-29

## 2024-03-29 NOTE — PROGRESS NOTES
Patient Name:  Abundio Pederson  MRN:  [id number]    Assessment & Plan     1. Traumatic complete tear of right rotator cuff, subsequent encounter  -     Comprehensive metabolic panel; Future  -     Hemoglobin A1C W/EAG Estimation; Future  -     CBC and differential; Future  -     Anemia Panel w/Reflex; Future  -     Protime-INR; Future  -     APTT; Future  -     Type and screen; Future  -     Ambulatory referral to Family Practice; Future  -     Ambulatory referral to Physical Therapy; Future  -     Case request operating room: ARTHROPLASTY SHOULDER REVERSE; Standing  -     EKG 12 lead; Future  -     XR chest pa & lateral; Future; Expected date: 03/29/2024  -     Ambulatory referral to Cardiology; Future  -     Case request operating room: ARTHROPLASTY SHOULDER REVERSE    2. Anterior dislocation of right shoulder, subsequent encounter    3. Right shoulder pain, unspecified chronicity    4. Osteoarthritis of glenohumeral joint, right      Right shoulder massive retracted full thickness retracted rotator cuff tears with concomitant mild glenohumeral degenerative change s/p anterior shoulder dislocation sustained 09/27/2023  The patient's CT blue print was reviewed today.   The patient has failed physical therapy which provided no pain relief and increased pain.  Imaging displays full-thickness retracted tears of supraspinatus, infraspinatus, and subscapularis tendons.  These tears do not appear amenable to rotator cuff repair.  Due to the size of his tears, persistent deficits on exam, pain, age, and activity level, surgical intervention was discussed in the form of REVERSE RIGHT total shoulder arthroplasty.    Risks of surgery, including but not limited to, anesthesia complications, infection, damage to nerves and blood vessels, blood clots, postoperative stiffness, residual pain, recurrent instability, residual weakness, need for subsequent surgery, dislocations, fractures, and even death were discussed at length.   The patient's MRI displays large retracted rotator cuff tear.   The patient understands the risks and benefits of all mentioned treatment options and has no further questions.  The patient was educated the surgery is primarily to improve pain and secondarily to improve function. The patient has elected to proceed forward with REVERSE RIGHT TOTAL shoulder arthroplasty.   Clearance will be required in the form of PCP, cardiology, EKG, chest x-ray and lab work.  The patient was advised they will be in pain after surgery and would wear a sling for 4 weeks. The patient was also informed they will attend physical therapy postoperatively, 2 times per week.  The patient was advised typically they cannot drive for 6 weeks after surgery.   The patient is encouraged to continue working on range of motion leading up to surgery.   Consent for surgery was obtained today.  I will see Abundio on the day of surgery, tentatively scheduled for 4/4/24.  I will see the patient back 2 weeks postoperatively.      History of the Present Illness   Abundio Pederson is a 73 y.o. male with Right shoulder rotator cuff tear status post shoulder dislocation on 9/27/23 with glenohumeral osteoarthritis. Pain is rated 4/10. It continues to effect and limit his activities of daily living. The patient continues to have daily pain and discomfort. He would like to move forward with surgery. The patient has very limited range of motion of his shoulder.       Review of Systems     Review of Systems   Constitutional:  Negative for appetite change and unexpected weight change.   HENT:  Negative for congestion and trouble swallowing.    Eyes:  Negative for visual disturbance.   Respiratory:  Negative for cough and shortness of breath.    Cardiovascular:  Negative for chest pain and palpitations.   Gastrointestinal:  Negative for nausea and vomiting.   Endocrine: Negative for cold intolerance and heat intolerance.   Musculoskeletal:  Positive for arthralgias.  "Negative for joint swelling and myalgias.   Skin:  Negative for rash.   Neurological:  Negative for numbness.       Physical Exam     /76   Pulse 78   Ht 5' 11\" (1.803 m)   Wt 117 kg (258 lb 3.2 oz)   BMI 36.01 kg/m²     Right Shoulder:   Active range of motion   10 degrees forward flexion  30 degrees abduction  0 degrees external rotation   SI joint internal rotation    Passive range of motion   90 degrees of forward flexion   3/5 internal rotation  3/5 forward flexion  2/5 external rotation  The patient is neurovascularly intact distally in the extremity.    Heart: normal rhythm and rate  Lungs: clear to auscultation      Data Review     I have personally reviewed pertinent films in PACS, and my interpretation follows.    CT blue print taken 2/1/24 of Right shoulder reviewed for surgical planning once again depicting full-thickness rotator cuff tears and glenohumeral degenerative changes.    MRI performed of Right shoulder performed 1/2/2024 demonstrates full thickness tearing of supraspinatus, infraspinatus and subscapularis tendons with retraction noted and mild to moderate atrophy, partial-thickness tearing of teres minor.  High-grade partial-thickness tearing of proximal biceps tendon present.     X-rays taken 10/05/2023 of Right shoulder independently reviewed and demonstrate reduced glenohumeral joint. Minimal to mild glenohumeral degenerative changes. No obvious acute fracture or dislocation noted.     Social History     Tobacco Use    Smoking status: Never    Smokeless tobacco: Never   Vaping Use    Vaping status: Never Used   Substance Use Topics    Alcohol use: Never    Drug use: Never           Procedures  None performed.     Girish Ureña DO   Scribe Attestation      I,:  Nyasia Ramesh am acting as a scribe while in the presence of the attending physician.:       I,:  Girish Ureña DO personally performed the services described in this documentation    as scribed in my presence.:       "

## 2024-04-01 ENCOUNTER — TELEPHONE (OUTPATIENT)
Dept: OBGYN CLINIC | Facility: CLINIC | Age: 74
End: 2024-04-01

## 2024-04-01 ENCOUNTER — TELEPHONE (OUTPATIENT)
Age: 74
End: 2024-04-01

## 2024-04-01 ENCOUNTER — APPOINTMENT (OUTPATIENT)
Dept: LAB | Facility: HOSPITAL | Age: 74
End: 2024-04-01
Payer: OTHER MISCELLANEOUS

## 2024-04-01 ENCOUNTER — HOSPITAL ENCOUNTER (OUTPATIENT)
Dept: RADIOLOGY | Facility: HOSPITAL | Age: 74
Discharge: HOME | End: 2024-04-01
Payer: OTHER MISCELLANEOUS

## 2024-04-01 DIAGNOSIS — Z01.818 PREOPERATIVE TESTING: ICD-10-CM

## 2024-04-01 DIAGNOSIS — Z01.818 PRE-OP EVALUATION: ICD-10-CM

## 2024-04-01 DIAGNOSIS — S46.011D TRAUMATIC COMPLETE TEAR OF RIGHT ROTATOR CUFF, SUBSEQUENT ENCOUNTER: ICD-10-CM

## 2024-04-01 LAB
ABO GROUP BLD: NORMAL
ALBUMIN SERPL BCP-MCNC: 3.9 G/DL (ref 3.5–5)
ALP SERPL-CCNC: 82 U/L (ref 34–104)
ALT SERPL W P-5'-P-CCNC: 21 U/L (ref 7–52)
ANION GAP SERPL CALCULATED.3IONS-SCNC: 8 MMOL/L (ref 4–13)
APTT PPP: 31 SECONDS (ref 23–37)
AST SERPL W P-5'-P-CCNC: 18 U/L (ref 13–39)
BASOPHILS # BLD AUTO: 0.06 THOUSANDS/ÂΜL (ref 0–0.1)
BASOPHILS NFR BLD AUTO: 1 % (ref 0–1)
BILIRUB SERPL-MCNC: 0.43 MG/DL (ref 0.2–1)
BLD GP AB SCN SERPL QL: NEGATIVE
BUN SERPL-MCNC: 19 MG/DL (ref 5–25)
CALCIUM SERPL-MCNC: 8.9 MG/DL (ref 8.4–10.2)
CHLORIDE SERPL-SCNC: 105 MMOL/L (ref 96–108)
CO2 SERPL-SCNC: 26 MMOL/L (ref 21–32)
CREAT SERPL-MCNC: 0.88 MG/DL (ref 0.6–1.3)
CRP SERPL QL: 6.6 MG/L
EOSINOPHIL # BLD AUTO: 0.39 THOUSAND/ÂΜL (ref 0–0.61)
EOSINOPHIL NFR BLD AUTO: 4 % (ref 0–6)
ERYTHROCYTE [DISTWIDTH] IN BLOOD BY AUTOMATED COUNT: 13.3 % (ref 11.6–15.1)
EST. AVERAGE GLUCOSE BLD GHB EST-MCNC: 128 MG/DL
FERRITIN SERPL-MCNC: 109 NG/ML (ref 24–336)
GFR SERPL CREATININE-BSD FRML MDRD: 86 ML/MIN/1.73SQ M
GLUCOSE P FAST SERPL-MCNC: 83 MG/DL (ref 65–99)
HBA1C MFR BLD: 6.1 %
HCT VFR BLD AUTO: 39.9 % (ref 36.5–49.3)
HGB BLD-MCNC: 12.9 G/DL (ref 12–17)
IMM GRANULOCYTES # BLD AUTO: 0.02 THOUSAND/UL (ref 0–0.2)
IMM GRANULOCYTES NFR BLD AUTO: 0 % (ref 0–2)
INR PPP: 1.08 (ref 0.84–1.19)
IRON SATN MFR SERPL: 26 % (ref 15–50)
IRON SERPL-MCNC: 90 UG/DL (ref 50–212)
LYMPHOCYTES # BLD AUTO: 1.82 THOUSANDS/ÂΜL (ref 0.6–4.47)
LYMPHOCYTES NFR BLD AUTO: 19 % (ref 14–44)
MCH RBC QN AUTO: 30.8 PG (ref 26.8–34.3)
MCHC RBC AUTO-ENTMCNC: 32.3 G/DL (ref 31.4–37.4)
MCV RBC AUTO: 95 FL (ref 82–98)
MONOCYTES # BLD AUTO: 0.64 THOUSAND/ÂΜL (ref 0.17–1.22)
MONOCYTES NFR BLD AUTO: 7 % (ref 4–12)
NEUTROPHILS # BLD AUTO: 6.64 THOUSANDS/ÂΜL (ref 1.85–7.62)
NEUTS SEG NFR BLD AUTO: 69 % (ref 43–75)
NRBC BLD AUTO-RTO: 0 /100 WBCS
PLATELET # BLD AUTO: 279 THOUSANDS/UL (ref 149–390)
PMV BLD AUTO: 10.5 FL (ref 8.9–12.7)
POTASSIUM SERPL-SCNC: 4.2 MMOL/L (ref 3.5–5.3)
PROT SERPL-MCNC: 7.2 G/DL (ref 6.4–8.4)
PROTHROMBIN TIME: 14.6 SECONDS (ref 11.6–14.5)
RBC # BLD AUTO: 4.19 MILLION/UL (ref 3.88–5.62)
RETICS # AUTO: NORMAL 10*3/UL (ref 14356–105094)
RETICS # CALC: 1.72 % (ref 0.37–1.87)
RH BLD: POSITIVE
SODIUM SERPL-SCNC: 139 MMOL/L (ref 135–147)
SPECIMEN EXPIRATION DATE: NORMAL
TIBC SERPL-MCNC: 340 UG/DL (ref 250–450)
UIBC SERPL-MCNC: 250 UG/DL (ref 155–355)
WBC # BLD AUTO: 9.57 THOUSAND/UL (ref 4.31–10.16)

## 2024-04-01 PROCEDURE — 85610 PROTHROMBIN TIME: CPT

## 2024-04-01 PROCEDURE — 36415 COLL VENOUS BLD VENIPUNCTURE: CPT

## 2024-04-01 PROCEDURE — 83036 HEMOGLOBIN GLYCOSYLATED A1C: CPT

## 2024-04-01 PROCEDURE — 80053 COMPREHEN METABOLIC PANEL: CPT

## 2024-04-01 PROCEDURE — 83550 IRON BINDING TEST: CPT

## 2024-04-01 PROCEDURE — 85025 COMPLETE CBC W/AUTO DIFF WBC: CPT

## 2024-04-01 PROCEDURE — 86901 BLOOD TYPING SEROLOGIC RH(D): CPT

## 2024-04-01 PROCEDURE — 85730 THROMBOPLASTIN TIME PARTIAL: CPT

## 2024-04-01 PROCEDURE — 86140 C-REACTIVE PROTEIN: CPT

## 2024-04-01 PROCEDURE — 86850 RBC ANTIBODY SCREEN: CPT

## 2024-04-01 PROCEDURE — 86900 BLOOD TYPING SEROLOGIC ABO: CPT

## 2024-04-01 PROCEDURE — 82728 ASSAY OF FERRITIN: CPT

## 2024-04-01 PROCEDURE — 83540 ASSAY OF IRON: CPT

## 2024-04-01 PROCEDURE — 71046 X-RAY EXAM CHEST 2 VIEWS: CPT

## 2024-04-01 PROCEDURE — 85045 AUTOMATED RETICULOCYTE COUNT: CPT

## 2024-04-01 NOTE — TELEPHONE ENCOUNTER
Called pt, spoke with wife Johana, explained I have a 4/10/24 surgery slot available if he is interested.  The patient would like to proceed on 4/10/24.  I did speak with Coreen at Diane Hickey's office to get clearance.  Coreen said they are waiting on the pre-op testing to be done.  Pt is going for pre-op testing this afternoon, once it is complete I will fax it to Coreen's attention at [phone].      All surgery instructions were reviewed with the patient.    Explained how to use the surgical soap and wipes.    NPO after midnight day prior to surgery.      to take him home on discharge.     Pre-op testing to be completed.    Nurse Navigator will call pt.    PT order given to pt to schedule pre and post-op PT closer to home. He is scheduled for pre-op PT on 4/8/24.     Hospital will call afternoon prior to procedure to give time to arrive for surgery.    Patient given my direct number, [phone], to call with questions.     Total Joint Bag given to patient.        None

## 2024-04-01 NOTE — TELEPHONE ENCOUNTER
Caller: Quiana    Doctor/Office: Niranjan    CB#: [phone]      What needs to be faxed: 3/29 ov note    ATTN to: Rajat    Fax#: [phone]      Documents were successfully e-faxed

## 2024-04-02 ENCOUNTER — ANESTHESIA EVENT (OUTPATIENT)
Dept: PERIOP | Facility: HOSPITAL | Age: 74
End: 2024-04-02
Payer: OTHER MISCELLANEOUS

## 2024-04-02 NOTE — TELEPHONE ENCOUNTER
Faxed pts pre-op test results and cardiology and pulmonology clearances to Coreen at Diane Hickey PA-C office for review for PCP clearance.

## 2024-04-03 RX ORDER — IBUPROFEN 200 MG
TABLET ORAL EVERY 6 HOURS PRN
COMMUNITY
End: 2024-05-30

## 2024-04-03 RX ORDER — ACETAMINOPHEN 500 MG
500 TABLET ORAL EVERY 6 HOURS PRN
COMMUNITY
End: 2024-05-30

## 2024-04-03 NOTE — PRE-PROCEDURE INSTRUCTIONS
Pre-Surgery Instructions:   Medication Instructions    acetaminophen (TYLENOL) 500 mg tablet Uses PRN- OK to take day of surgery    albuterol (2.5 mg/3 mL) 0.083 % nebulizer solution Uses PRN- OK to take day of surgery    albuterol (PROVENTIL HFA,VENTOLIN HFA) 90 mcg/act inhaler Uses PRN- OK to take day of surgery    Aspirin 81 MG CAPS Instructions provided by MD cardiology    atorvastatin (LIPITOR) 40 mg tablet Take night before surgery    ergocalciferol (VITAMIN D2) 50,000 units Stop taking 7 days prior to surgery.    famotidine (PEPCID) 20 mg tablet Take day of surgery.    furosemide (LASIX) 20 mg tablet Hold day of surgery.    ibuprofen (MOTRIN) 200 mg tablet Stop taking 7 days prior to surgery.    isosorbide mononitrate (IMDUR) 60 mg 24 hr tablet Take day of surgery.    Magnesium 250 MG TABS Stop taking 7 days prior to surgery.    Melatonin 5 MG CAPS Stop taking 7 days prior to surgery.    metoprolol tartrate (LOPRESSOR) 50 mg tablet Take day of surgery.    potassium chloride (KLOR-CON) 20 mEq packet Hold day of surgery.    Symbicort 160-4.5 MCG/ACT inhaler Continue to take these inhaler medications on your normal schedule up to and including the day of surgery.    Medication instructions for day surgery reviewed. Please use only a sip of water to take your instructed medications. Avoid all over the counter vitamins, supplements and NSAIDS for one week prior to surgery per anesthesia guidelines. Tylenol is ok to take as needed.     You will receive a call one business day prior to surgery with an arrival time and hospital directions. If your surgery is scheduled on a Monday, the hospital will be calling you on the Friday prior to your surgery. If you have not heard from anyone by 8pm, please call the hospital supervisor through the hospital  at [phone]. ([place] [phone] or [place] [phone]).    Do not eat or drink anything after midnight the night before your surgery, including candy,  mints, lifesavers, or chewing gum. Do not drink alcohol 24hrs before your surgery. Try not to smoke at least 24hrs before your surgery.       Follow the pre surgery showering instructions as listed in the “My Surgical Experience Booklet” or otherwise provided by your surgeon's office. Do not use a blade to shave the surgical area 1 week before surgery. It is okay to use a clean electric clippers up to 24 hours before surgery. Do not apply any lotions, creams, including makeup, cologne, deodorant, or perfumes after showering on the day of your surgery. Do not use dry shampoo, hair spray, hair gel, or any type of hair products.     No contact lenses, eye make-up, or artificial eyelashes. Remove nail polish, including gel polish, and any artificial, gel, or acrylic nails if possible. Remove all jewelry including rings and body piercing jewelry.     Wear causal clothing that is easy to take on and off. Consider your type of surgery.    Keep any valuables, jewelry, piercings at home. Please bring any specially ordered equipment (sling, braces) if indicated.    Arrange for a responsible person to drive you to and from the hospital on the day of your surgery. Please confirm the visitor policy for the day of your procedure when you receive your phone call with an arrival time.     Call the surgeon's office with any new illnesses, exposures, or additional questions prior to surgery.    Please reference your “My Surgical Experience Booklet” for additional information to prepare for your upcoming surgery.

## 2024-04-04 ENCOUNTER — TELEPHONE (OUTPATIENT)
Dept: OBGYN CLINIC | Facility: CLINIC | Age: 74
End: 2024-04-04

## 2024-04-04 NOTE — TELEPHONE ENCOUNTER
Pts wife called to find out when he should stop his aspirin prior to surgery.  Cardiology told her it was up to the surgeon but usually it is seven days prior.      I sent a message to Dr. Ureña.

## 2024-04-05 NOTE — TELEPHONE ENCOUNTER
Called pt and spoke with wife Johana to let her know that he should discontinue the aspirin until surgery on 4/10/24.

## 2024-04-08 ENCOUNTER — TELEPHONE (OUTPATIENT)
Dept: OBGYN CLINIC | Facility: CLINIC | Age: 74
End: 2024-04-08

## 2024-04-08 LAB
ABO GROUP BLD: NORMAL
RH BLD: POSITIVE

## 2024-04-10 ENCOUNTER — ANESTHESIA (OUTPATIENT)
Dept: PERIOP | Facility: HOSPITAL | Age: 74
End: 2024-04-10
Payer: OTHER MISCELLANEOUS

## 2024-04-19 DIAGNOSIS — Z01.818 PREOP TESTING: Primary | ICD-10-CM

## 2024-04-29 ENCOUNTER — TELEPHONE (OUTPATIENT)
Dept: OBGYN CLINIC | Facility: CLINIC | Age: 74
End: 2024-04-29

## 2024-04-29 NOTE — TELEPHONE ENCOUNTER
Returned patient's wife, Johana, call regarding his dental work.  Pt had two of the four teeth extracted 4/26/24 and will have the other two removed on 5/8/24.      I did tell Johana that I would be reaching out to the PCP, Cardiology and Pulmonology to see if they will update the clearance note because it is over sixty days.

## 2024-05-10 NOTE — TELEPHONE ENCOUNTER
Faxed clearance request to Dr. Sterling at [facility] at [phone].

## 2024-05-10 NOTE — TELEPHONE ENCOUNTER
Called and spoke with pts wife, Johana, regarding the dental clearance.  She gave me the phone number, [phone], for Dr. Sterling, so that I can call and request a note.  I did tell her that at this point the only thing we are waiting on is the dental clearance.  Pt is scheduled to follow up with Dr. Ureña on 5/20/24 prior to his procedure.

## 2024-05-20 ENCOUNTER — OFFICE VISIT (OUTPATIENT)
Dept: OBGYN CLINIC | Facility: CLINIC | Age: 74
End: 2024-05-20

## 2024-05-20 ENCOUNTER — APPOINTMENT (OUTPATIENT)
Dept: LAB | Facility: HOSPITAL | Age: 74
End: 2024-05-20
Payer: COMMERCIAL

## 2024-05-20 VITALS
HEART RATE: 58 BPM | SYSTOLIC BLOOD PRESSURE: 125 MMHG | HEIGHT: 71 IN | DIASTOLIC BLOOD PRESSURE: 77 MMHG | WEIGHT: 254.8 LBS | BODY MASS INDEX: 35.67 KG/M2

## 2024-05-20 DIAGNOSIS — M25.511 CHRONIC RIGHT SHOULDER PAIN: ICD-10-CM

## 2024-05-20 DIAGNOSIS — Z01.818 PREOP TESTING: ICD-10-CM

## 2024-05-20 DIAGNOSIS — G89.29 CHRONIC RIGHT SHOULDER PAIN: ICD-10-CM

## 2024-05-20 DIAGNOSIS — S46.011D TRAUMATIC COMPLETE TEAR OF RIGHT ROTATOR CUFF, SUBSEQUENT ENCOUNTER: Primary | ICD-10-CM

## 2024-05-20 DIAGNOSIS — Z01.818 OTHER SPECIFIED PRE-OPERATIVE EXAMINATION: ICD-10-CM

## 2024-05-20 DIAGNOSIS — Z01.818 PRE-OP EVALUATION: ICD-10-CM

## 2024-05-20 DIAGNOSIS — S43.004D DISLOCATION OF RIGHT SHOULDER JOINT, SUBSEQUENT ENCOUNTER: ICD-10-CM

## 2024-05-20 PROBLEM — M19.011 OSTEOARTHRITIS OF GLENOHUMERAL JOINT, RIGHT: Status: ACTIVE | Noted: 2024-05-20

## 2024-05-20 LAB
ABO GROUP BLD: NORMAL
BLD GP AB SCN SERPL QL: NEGATIVE
RH BLD: POSITIVE
SPECIMEN EXPIRATION DATE: NORMAL

## 2024-05-20 PROCEDURE — 86850 RBC ANTIBODY SCREEN: CPT

## 2024-05-20 PROCEDURE — 86901 BLOOD TYPING SEROLOGIC RH(D): CPT

## 2024-05-20 PROCEDURE — 99214 OFFICE O/P EST MOD 30 MIN: CPT | Performed by: ORTHOPAEDIC SURGERY

## 2024-05-20 PROCEDURE — 36415 COLL VENOUS BLD VENIPUNCTURE: CPT

## 2024-05-20 PROCEDURE — 86900 BLOOD TYPING SEROLOGIC ABO: CPT

## 2024-05-20 NOTE — PROGRESS NOTES
Patient Name:  Abundio Pederson  MRN:  [id number]    Assessment & Plan     1. Traumatic complete tear of right rotator cuff, subsequent encounter  -     Arc 2.0  2. Chronic right shoulder pain  -     Arc 2.0  3. 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 mild glenohumeral osteoarthritis status post injury with glenohumeral dislocation  Reviewed imaging again with patient  Patient cleared from PCP, cardiology, pulmonology, and dental surgery   Again, discussed procedure, overnight hospital stay, therapy, pain medication regimen, post op immobilization, return to active emotion and unrestricted activity  Fitted patient for sling today and will bring day of surgery  Risks of surgery, including but not limited to, anesthesia complications, infection, damage to nerves and blood vessels, blood clots, PE, would healing complications, iatrogenic fracture, periprosthetic fracture, postoperative stiffness, residual pain, recurrent instability, residual weakness, need for subsequent surgery, dislocations, fractures, and even death were discussed at length.   The patient understands the risks and benefits of all mentioned treatment options and has no further questions. The patient was educated the surgery is primarily to improve pain and secondarily to improve function.   I will see the patient back 2 weeks postoperatively.      History of the Present Illness   Abundio Pederson is a 73 y.o. male with Right shoulder massive rotator cuff tear and glenohumeral osteoarthritis. Today, patient reports he had two rounds of dental surgery, the first on 04/26/2024 and the second on 05/08/2024. He admits the first surgery required antibiotics, but not the second surgery. He denies recent fevers, chills, or complications from dental surgery. He admits to persistent Right shoulder pain with attempted range of motion.         Review of Systems     Review of Systems  "  Constitutional:  Negative for chills and fever.   HENT:  Negative for ear pain and sore throat.    Eyes:  Negative for pain and visual disturbance.   Respiratory:  Negative for cough and shortness of breath.    Cardiovascular:  Negative for chest pain and palpitations.   Gastrointestinal:  Negative for abdominal pain and vomiting.   Genitourinary:  Negative for dysuria and hematuria.   Musculoskeletal:  Negative for arthralgias and back pain.   Skin:  Negative for color change and rash.   Neurological:  Negative for seizures and syncope.   All other systems reviewed and are negative.      Physical Exam     /77   Pulse 58   Ht 5' 11\" (1.803 m)   Wt 116 kg (254 lb 12.8 oz)   BMI 35.54 kg/m²     Right Shoulder:   Active range of motion   10 degrees forward flexion  30 degrees abduction  Neutral external rotation   SI joint internal rotation    Passive range of motion   90 degrees of forward flexion   Internal rotation testing 3/5   Forward flexion testing 3/5  External rotation testing 2/5  Palpable deltoid contraction  The patient is neurovascularly intact distally in the extremity.      Data Review     I have personally reviewed pertinent films in PACS, and my interpretation follows.    CT blue print taken 2/1/24 of Right shoulder reviewed for surgical planning once again depicting full-thickness rotator cuff tears and glenohumeral degenerative changes.     MRI performed of Right shoulder performed 1/2/2024 demonstrates full thickness tearing of supraspinatus, infraspinatus and subscapularis tendons with retraction noted and mild to moderate atrophy, partial-thickness tearing of teres minor.  High-grade partial-thickness tearing of proximal biceps tendon present.     X-rays taken 10/05/2023 of Right shoulder independently reviewed and demonstrate reduced glenohumeral joint. Minimal to mild glenohumeral degenerative changes. No obvious acute fracture or dislocation noted.     Social History     Tobacco Use "    Smoking status: Former     Types: Cigarettes    Smokeless tobacco: Never    Tobacco comments:     Quit 1991   Vaping Use    Vaping status: Never Used   Substance Use Topics    Alcohol use: Not Currently    Drug use: Never           Procedures  None    Girish Ureña DO

## 2024-05-20 NOTE — H&P (VIEW-ONLY)
Patient Name:  Abundio Pederson  MRN:  [id number]    Assessment & Plan     1. Traumatic complete tear of right rotator cuff, subsequent encounter  -     Arc 2.0  2. Chronic right shoulder pain  -     Arc 2.0  3. 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 mild glenohumeral osteoarthritis status post injury with glenohumeral dislocation  Reviewed imaging again with patient  Patient cleared from PCP, cardiology, pulmonology, and dental surgery   Again, discussed procedure, overnight hospital stay, therapy, pain medication regimen, post op immobilization, return to active emotion and unrestricted activity  Fitted patient for sling today and will bring day of surgery  Risks of surgery, including but not limited to, anesthesia complications, infection, damage to nerves and blood vessels, blood clots, PE, would healing complications, iatrogenic fracture, periprosthetic fracture, postoperative stiffness, residual pain, recurrent instability, residual weakness, need for subsequent surgery, dislocations, fractures, and even death were discussed at length.   The patient understands the risks and benefits of all mentioned treatment options and has no further questions. The patient was educated the surgery is primarily to improve pain and secondarily to improve function.   I will see the patient back 2 weeks postoperatively.      History of the Present Illness   Abundio Pederson is a 73 y.o. male with Right shoulder massive rotator cuff tear and glenohumeral osteoarthritis. Today, patient reports he had two rounds of dental surgery, the first on 04/26/2024 and the second on 05/08/2024. He admits the first surgery required antibiotics, but not the second surgery. He denies recent fevers, chills, or complications from dental surgery. He admits to persistent Right shoulder pain with attempted range of motion.         Review of Systems     Review of Systems  "  Constitutional:  Negative for chills and fever.   HENT:  Negative for ear pain and sore throat.    Eyes:  Negative for pain and visual disturbance.   Respiratory:  Negative for cough and shortness of breath.    Cardiovascular:  Negative for chest pain and palpitations.   Gastrointestinal:  Negative for abdominal pain and vomiting.   Genitourinary:  Negative for dysuria and hematuria.   Musculoskeletal:  Negative for arthralgias and back pain.   Skin:  Negative for color change and rash.   Neurological:  Negative for seizures and syncope.   All other systems reviewed and are negative.      Physical Exam     /77   Pulse 58   Ht 5' 11\" (1.803 m)   Wt 116 kg (254 lb 12.8 oz)   BMI 35.54 kg/m²     Right Shoulder:   Active range of motion   10 degrees forward flexion  30 degrees abduction  Neutral external rotation   SI joint internal rotation    Passive range of motion   90 degrees of forward flexion   Internal rotation testing 3/5   Forward flexion testing 3/5  External rotation testing 2/5  Palpable deltoid contraction  The patient is neurovascularly intact distally in the extremity.      Data Review     I have personally reviewed pertinent films in PACS, and my interpretation follows.    CT blue print taken 2/1/24 of Right shoulder reviewed for surgical planning once again depicting full-thickness rotator cuff tears and glenohumeral degenerative changes.     MRI performed of Right shoulder performed 1/2/2024 demonstrates full thickness tearing of supraspinatus, infraspinatus and subscapularis tendons with retraction noted and mild to moderate atrophy, partial-thickness tearing of teres minor.  High-grade partial-thickness tearing of proximal biceps tendon present.     X-rays taken 10/05/2023 of Right shoulder independently reviewed and demonstrate reduced glenohumeral joint. Minimal to mild glenohumeral degenerative changes. No obvious acute fracture or dislocation noted.     Social History     Tobacco Use "    Smoking status: Former     Types: Cigarettes    Smokeless tobacco: Never    Tobacco comments:     Quit 1991   Vaping Use    Vaping status: Never Used   Substance Use Topics    Alcohol use: Not Currently    Drug use: Never           Procedures  None    Girish Ureña DO

## 2024-05-28 ENCOUNTER — TELEPHONE (OUTPATIENT)
Dept: OBGYN CLINIC | Facility: CLINIC | Age: 74
End: 2024-05-28

## 2024-05-28 NOTE — TELEPHONE ENCOUNTER
Called Pulmonology office in regards to updated clearance. Per office they will fax one over with an updated date shortly.

## 2024-05-28 NOTE — TELEPHONE ENCOUNTER
----- Message from Nikkie Hassan MD sent at 5/28/2024 10:49 AM CDT -----  Please tell patient: Your results antibodies are normal. You are a diabetic type II. Follow up as planned.   Called pt, let him know that his procedure on 4/10/24 would have to be postponed due to his dental abscess.  I spoke with pts wife and advised her that he would need dental clearance prior to the procedure.  He was tentatively rescheduled for 5/2/24 pending the dental clearance.

## 2024-05-29 ENCOUNTER — HOSPITAL ENCOUNTER (OUTPATIENT)
Facility: HOSPITAL | Age: 74
Setting detail: OUTPATIENT SURGERY
Discharge: HOME | End: 2024-05-30
Attending: ORTHOPAEDIC SURGERY | Admitting: ORTHOPAEDIC SURGERY
Payer: OTHER MISCELLANEOUS

## 2024-05-29 ENCOUNTER — APPOINTMENT (OUTPATIENT)
Dept: RADIOLOGY | Facility: HOSPITAL | Age: 74
End: 2024-05-29
Payer: OTHER MISCELLANEOUS

## 2024-05-29 DIAGNOSIS — Z96.611 S/P REVERSE TOTAL SHOULDER ARTHROPLASTY, RIGHT: ICD-10-CM

## 2024-05-29 DIAGNOSIS — S46.011D TRAUMATIC COMPLETE TEAR OF RIGHT ROTATOR CUFF, SUBSEQUENT ENCOUNTER: Primary | ICD-10-CM

## 2024-05-29 PROCEDURE — 73030 X-RAY EXAM OF SHOULDER: CPT

## 2024-05-29 PROCEDURE — C1776 JOINT DEVICE (IMPLANTABLE): HCPCS | Performed by: ORTHOPAEDIC SURGERY

## 2024-05-29 PROCEDURE — 23472 RECONSTRUCT SHOULDER JOINT: CPT | Performed by: PHYSICIAN ASSISTANT

## 2024-05-29 PROCEDURE — C1713 ANCHOR/SCREW BN/BN,TIS/BN: HCPCS | Performed by: ORTHOPAEDIC SURGERY

## 2024-05-29 PROCEDURE — C9290 INJ, BUPIVACAINE LIPOSOME: HCPCS | Performed by: STUDENT IN AN ORGANIZED HEALTH CARE EDUCATION/TRAINING PROGRAM

## 2024-05-29 PROCEDURE — 85027 COMPLETE CBC AUTOMATED: CPT | Performed by: PHYSICIAN ASSISTANT

## 2024-05-29 PROCEDURE — 80053 COMPREHEN METABOLIC PANEL: CPT | Performed by: PHYSICIAN ASSISTANT

## 2024-05-29 PROCEDURE — 23472 RECONSTRUCT SHOULDER JOINT: CPT | Performed by: ORTHOPAEDIC SURGERY

## 2024-05-29 DEVICE — IMPLANTABLE DEVICE
Type: IMPLANTABLE DEVICE | Site: SHOULDER | Status: FUNCTIONAL
Brand: TORNIER PERFORM® REVERSED GLENOID

## 2024-05-29 DEVICE — SCREW PERIPHERAL 5 X 26MM: Type: IMPLANTABLE DEVICE | Site: SHOULDER | Status: FUNCTIONAL

## 2024-05-29 DEVICE — IMPLANTABLE DEVICE: Type: IMPLANTABLE DEVICE | Site: SHOULDER | Status: FUNCTIONAL

## 2024-05-29 DEVICE — IMPLANTABLE DEVICE
Type: IMPLANTABLE DEVICE | Site: SHOULDER | Status: FUNCTIONAL
Brand: TORNIER FLEX SHOULDER SYSTEM

## 2024-05-29 DEVICE — SCREW PERIPHERAL 5 X 38MM: Type: IMPLANTABLE DEVICE | Site: SHOULDER | Status: FUNCTIONAL

## 2024-05-29 RX ORDER — CHLORHEXIDINE GLUCONATE 40 MG/ML
SOLUTION TOPICAL DAILY PRN
Status: DISCONTINUED | OUTPATIENT
Start: 2024-05-29 | End: 2024-05-29 | Stop reason: HOSPADM

## 2024-05-29 RX ORDER — UREA 10 %
250 LOTION (ML) TOPICAL DAILY
Status: DISCONTINUED | OUTPATIENT
Start: 2024-05-29 | End: 2024-05-30 | Stop reason: HOSPADM

## 2024-05-29 RX ORDER — CELECOXIB 200 MG/1
200 CAPSULE ORAL ONCE
Status: COMPLETED | OUTPATIENT
Start: 2024-05-29 | End: 2024-05-29

## 2024-05-29 RX ORDER — SODIUM CHLORIDE, SODIUM LACTATE, POTASSIUM CHLORIDE, CALCIUM CHLORIDE 600; 310; 30; 20 MG/100ML; MG/100ML; MG/100ML; MG/100ML
INJECTION, SOLUTION INTRAVENOUS CONTINUOUS PRN
Status: DISCONTINUED | OUTPATIENT
Start: 2024-05-29 | End: 2024-05-29

## 2024-05-29 RX ORDER — ROCURONIUM BROMIDE 10 MG/ML
INJECTION, SOLUTION INTRAVENOUS AS NEEDED
Status: DISCONTINUED | OUTPATIENT
Start: 2024-05-29 | End: 2024-05-29

## 2024-05-29 RX ORDER — FENTANYL CITRATE 50 UG/ML
INJECTION, SOLUTION INTRAMUSCULAR; INTRAVENOUS AS NEEDED
Status: DISCONTINUED | OUTPATIENT
Start: 2024-05-29 | End: 2024-05-29

## 2024-05-29 RX ORDER — TRAMADOL HYDROCHLORIDE 50 MG/1
50 TABLET ORAL EVERY 6 HOURS SCHEDULED
Status: DISCONTINUED | OUTPATIENT
Start: 2024-05-29 | End: 2024-05-30 | Stop reason: HOSPADM

## 2024-05-29 RX ORDER — DOCUSATE SODIUM 100 MG/1
100 CAPSULE, LIQUID FILLED ORAL 2 TIMES DAILY
Status: DISCONTINUED | OUTPATIENT
Start: 2024-05-29 | End: 2024-05-30 | Stop reason: HOSPADM

## 2024-05-29 RX ORDER — TRANEXAMIC ACID 10 MG/ML
1000 INJECTION, SOLUTION INTRAVENOUS ONCE
Status: DISCONTINUED | OUTPATIENT
Start: 2024-05-29 | End: 2024-05-29 | Stop reason: HOSPADM

## 2024-05-29 RX ORDER — LIDOCAINE HYDROCHLORIDE 10 MG/ML
INJECTION, SOLUTION EPIDURAL; INFILTRATION; INTRACAUDAL; PERINEURAL AS NEEDED
Status: DISCONTINUED | OUTPATIENT
Start: 2024-05-29 | End: 2024-05-29

## 2024-05-29 RX ORDER — DEXAMETHASONE SODIUM PHOSPHATE 10 MG/ML
INJECTION, SOLUTION INTRAMUSCULAR; INTRAVENOUS AS NEEDED
Status: DISCONTINUED | OUTPATIENT
Start: 2024-05-29 | End: 2024-05-29

## 2024-05-29 RX ORDER — DOCUSATE SODIUM 100 MG/1
200 CAPSULE, LIQUID FILLED ORAL
COMMUNITY

## 2024-05-29 RX ORDER — CHLORHEXIDINE GLUCONATE ORAL RINSE 1.2 MG/ML
15 SOLUTION DENTAL ONCE
Status: COMPLETED | OUTPATIENT
Start: 2024-05-29 | End: 2024-05-29

## 2024-05-29 RX ORDER — ALBUTEROL SULFATE 2.5 MG/3ML
2.5 SOLUTION RESPIRATORY (INHALATION) DAILY PRN
Status: DISCONTINUED | OUTPATIENT
Start: 2024-05-29 | End: 2024-05-30 | Stop reason: HOSPADM

## 2024-05-29 RX ORDER — LANOLIN ALCOHOL/MO/W.PET/CERES
6 CREAM (GRAM) TOPICAL
Status: DISCONTINUED | OUTPATIENT
Start: 2024-05-29 | End: 2024-05-30 | Stop reason: HOSPADM

## 2024-05-29 RX ORDER — FUROSEMIDE 40 MG/1
40 TABLET ORAL 2 TIMES DAILY
Status: DISCONTINUED | OUTPATIENT
Start: 2024-05-29 | End: 2024-05-30 | Stop reason: HOSPADM

## 2024-05-29 RX ORDER — PROPOFOL 10 MG/ML
INJECTION, EMULSION INTRAVENOUS AS NEEDED
Status: DISCONTINUED | OUTPATIENT
Start: 2024-05-29 | End: 2024-05-29

## 2024-05-29 RX ORDER — ERGOCALCIFEROL 1.25 MG/1
50000 CAPSULE ORAL WEEKLY
Status: DISCONTINUED | OUTPATIENT
Start: 2024-05-29 | End: 2024-05-30 | Stop reason: HOSPADM

## 2024-05-29 RX ORDER — OXYCODONE HYDROCHLORIDE 5 MG/1
5 TABLET ORAL EVERY 4 HOURS PRN
Status: DISCONTINUED | OUTPATIENT
Start: 2024-05-29 | End: 2024-05-30 | Stop reason: HOSPADM

## 2024-05-29 RX ORDER — POTASSIUM CHLORIDE 20MEQ/15ML
20 LIQUID (ML) ORAL DAILY
Status: DISCONTINUED | OUTPATIENT
Start: 2024-05-30 | End: 2024-05-30 | Stop reason: HOSPADM

## 2024-05-29 RX ORDER — VANCOMYCIN HYDROCHLORIDE 1 G/20ML
INJECTION, POWDER, LYOPHILIZED, FOR SOLUTION INTRAVENOUS AS NEEDED
Status: DISCONTINUED | OUTPATIENT
Start: 2024-05-29 | End: 2024-05-29 | Stop reason: HOSPADM

## 2024-05-29 RX ORDER — KETOROLAC TROMETHAMINE 30 MG/ML
15 INJECTION, SOLUTION INTRAMUSCULAR; INTRAVENOUS EVERY 8 HOURS SCHEDULED
Status: COMPLETED | OUTPATIENT
Start: 2024-05-29 | End: 2024-05-30

## 2024-05-29 RX ORDER — ISOSORBIDE MONONITRATE 60 MG/1
60 TABLET, EXTENDED RELEASE ORAL DAILY
Status: DISCONTINUED | OUTPATIENT
Start: 2024-05-29 | End: 2024-05-30 | Stop reason: HOSPADM

## 2024-05-29 RX ORDER — METOPROLOL TARTRATE 50 MG/1
50 TABLET, FILM COATED ORAL DAILY
Status: DISCONTINUED | OUTPATIENT
Start: 2024-05-29 | End: 2024-05-30 | Stop reason: HOSPADM

## 2024-05-29 RX ORDER — BUPIVACAINE HYDROCHLORIDE 5 MG/ML
INJECTION, SOLUTION EPIDURAL; INTRACAUDAL
Status: COMPLETED | OUTPATIENT
Start: 2024-05-29 | End: 2024-05-29

## 2024-05-29 RX ORDER — ONDANSETRON 2 MG/ML
INJECTION INTRAMUSCULAR; INTRAVENOUS AS NEEDED
Status: DISCONTINUED | OUTPATIENT
Start: 2024-05-29 | End: 2024-05-29

## 2024-05-29 RX ORDER — MIDAZOLAM HYDROCHLORIDE 2 MG/2ML
INJECTION, SOLUTION INTRAMUSCULAR; INTRAVENOUS AS NEEDED
Status: DISCONTINUED | OUTPATIENT
Start: 2024-05-29 | End: 2024-05-29

## 2024-05-29 RX ORDER — FENTANYL CITRATE/PF 50 MCG/ML
50 SYRINGE (ML) INJECTION
Status: DISCONTINUED | OUTPATIENT
Start: 2024-05-29 | End: 2024-05-29 | Stop reason: HOSPADM

## 2024-05-29 RX ORDER — TRANEXAMIC ACID 10 MG/ML
1000 INJECTION, SOLUTION INTRAVENOUS ONCE
Status: COMPLETED | OUTPATIENT
Start: 2024-05-29 | End: 2024-05-29

## 2024-05-29 RX ORDER — OXYCODONE HYDROCHLORIDE 10 MG/1
10 TABLET ORAL EVERY 4 HOURS PRN
Status: DISCONTINUED | OUTPATIENT
Start: 2024-05-29 | End: 2024-05-30 | Stop reason: HOSPADM

## 2024-05-29 RX ORDER — FAMOTIDINE 20 MG/1
20 TABLET, FILM COATED ORAL 2 TIMES DAILY
Status: DISCONTINUED | OUTPATIENT
Start: 2024-05-29 | End: 2024-05-30 | Stop reason: HOSPADM

## 2024-05-29 RX ORDER — MAGNESIUM HYDROXIDE 1200 MG/15ML
LIQUID ORAL AS NEEDED
Status: DISCONTINUED | OUTPATIENT
Start: 2024-05-29 | End: 2024-05-29 | Stop reason: HOSPADM

## 2024-05-29 RX ORDER — ALBUTEROL SULFATE 90 UG/1
1 AEROSOL, METERED RESPIRATORY (INHALATION) ONCE AS NEEDED
Status: DISCONTINUED | OUTPATIENT
Start: 2024-05-29 | End: 2024-05-30 | Stop reason: HOSPADM

## 2024-05-29 RX ORDER — BUDESONIDE AND FORMOTEROL FUMARATE DIHYDRATE 160; 4.5 UG/1; UG/1
2 AEROSOL RESPIRATORY (INHALATION) 2 TIMES DAILY
Status: DISCONTINUED | OUTPATIENT
Start: 2024-05-29 | End: 2024-05-30 | Stop reason: HOSPADM

## 2024-05-29 RX ORDER — ATORVASTATIN CALCIUM 40 MG/1
40 TABLET, FILM COATED ORAL
Status: DISCONTINUED | OUTPATIENT
Start: 2024-05-29 | End: 2024-05-30 | Stop reason: HOSPADM

## 2024-05-29 RX ORDER — HYDROMORPHONE HCL/PF 1 MG/ML
0.5 SYRINGE (ML) INJECTION
Status: DISCONTINUED | OUTPATIENT
Start: 2024-05-29 | End: 2024-05-29 | Stop reason: HOSPADM

## 2024-05-29 RX ORDER — SODIUM CHLORIDE, SODIUM LACTATE, POTASSIUM CHLORIDE, CALCIUM CHLORIDE 600; 310; 30; 20 MG/100ML; MG/100ML; MG/100ML; MG/100ML
125 INJECTION, SOLUTION INTRAVENOUS CONTINUOUS
Status: DISCONTINUED | OUTPATIENT
Start: 2024-05-29 | End: 2024-05-30 | Stop reason: HOSPADM

## 2024-05-29 RX ORDER — ACETAMINOPHEN 325 MG/1
650 TABLET ORAL EVERY 6 HOURS SCHEDULED
Status: DISCONTINUED | OUTPATIENT
Start: 2024-05-29 | End: 2024-05-30 | Stop reason: HOSPADM

## 2024-05-29 RX ORDER — METOCLOPRAMIDE HYDROCHLORIDE 5 MG/ML
10 INJECTION INTRAMUSCULAR; INTRAVENOUS ONCE AS NEEDED
Status: DISCONTINUED | OUTPATIENT
Start: 2024-05-29 | End: 2024-05-29 | Stop reason: HOSPADM

## 2024-05-29 RX ORDER — ACETAMINOPHEN 325 MG/1
975 TABLET ORAL ONCE
Status: COMPLETED | OUTPATIENT
Start: 2024-05-29 | End: 2024-05-29

## 2024-05-29 RX ADMIN — CELECOXIB 200 MG: 200 CAPSULE ORAL at 07:08

## 2024-05-29 RX ADMIN — FENTANYL CITRATE 50 MCG: 50 INJECTION, SOLUTION INTRAMUSCULAR; INTRAVENOUS at 07:55

## 2024-05-29 RX ADMIN — MIDAZOLAM HYDROCHLORIDE 2 MG: 1 INJECTION, SOLUTION INTRAMUSCULAR; INTRAVENOUS at 07:55

## 2024-05-29 RX ADMIN — FAMOTIDINE 20 MG: 20 TABLET, FILM COATED ORAL at 17:20

## 2024-05-29 RX ADMIN — SODIUM CHLORIDE, SODIUM LACTATE, POTASSIUM CHLORIDE, AND CALCIUM CHLORIDE 125 ML/HR: .6; .31; .03; .02 INJECTION, SOLUTION INTRAVENOUS at 14:24

## 2024-05-29 RX ADMIN — SUGAMMADEX 400 MG: 100 INJECTION, SOLUTION INTRAVENOUS at 11:11

## 2024-05-29 RX ADMIN — ACETAMINOPHEN 975 MG: 325 TABLET, FILM COATED ORAL at 07:08

## 2024-05-29 RX ADMIN — SODIUM CHLORIDE, SODIUM LACTATE, POTASSIUM CHLORIDE, AND CALCIUM CHLORIDE: .6; .31; .03; .02 INJECTION, SOLUTION INTRAVENOUS at 07:59

## 2024-05-29 RX ADMIN — BUDESONIDE AND FORMOTEROL FUMARATE DIHYDRATE 2 PUFF: 160; 4.5 AEROSOL RESPIRATORY (INHALATION) at 17:21

## 2024-05-29 RX ADMIN — ACETAMINOPHEN 650 MG: 325 TABLET, FILM COATED ORAL at 14:31

## 2024-05-29 RX ADMIN — ROCURONIUM BROMIDE 40 MG: 10 INJECTION, SOLUTION INTRAVENOUS at 09:34

## 2024-05-29 RX ADMIN — ATORVASTATIN CALCIUM 40 MG: 40 TABLET, FILM COATED ORAL at 21:17

## 2024-05-29 RX ADMIN — BUPIVACAINE 20 ML: 13.3 INJECTION, SUSPENSION, LIPOSOMAL INFILTRATION at 07:50

## 2024-05-29 RX ADMIN — Medication 3000 MG: at 08:34

## 2024-05-29 RX ADMIN — Medication 250 MG: at 14:31

## 2024-05-29 RX ADMIN — KETOROLAC TROMETHAMINE 15 MG: 30 INJECTION, SOLUTION INTRAMUSCULAR; INTRAVENOUS at 14:31

## 2024-05-29 RX ADMIN — CHLORHEXIDINE GLUCONATE 0.12% ORAL RINSE 15 ML: 1.2 LIQUID ORAL at 07:08

## 2024-05-29 RX ADMIN — SODIUM CHLORIDE, SODIUM LACTATE, POTASSIUM CHLORIDE, AND CALCIUM CHLORIDE 125 ML/HR: .6; .31; .03; .02 INJECTION, SOLUTION INTRAVENOUS at 07:09

## 2024-05-29 RX ADMIN — PROPOFOL 200 MG: 10 INJECTION, EMULSION INTRAVENOUS at 08:03

## 2024-05-29 RX ADMIN — TRAMADOL HYDROCHLORIDE 50 MG: 50 TABLET, COATED ORAL at 21:17

## 2024-05-29 RX ADMIN — METOPROLOL TARTRATE 50 MG: 50 TABLET, FILM COATED ORAL at 14:31

## 2024-05-29 RX ADMIN — KETOROLAC TROMETHAMINE 15 MG: 30 INJECTION, SOLUTION INTRAMUSCULAR; INTRAVENOUS at 21:17

## 2024-05-29 RX ADMIN — DOCUSATE SODIUM 100 MG: 100 CAPSULE, LIQUID FILLED ORAL at 17:20

## 2024-05-29 RX ADMIN — PROPOFOL 100 MG: 10 INJECTION, EMULSION INTRAVENOUS at 08:04

## 2024-05-29 RX ADMIN — FENTANYL CITRATE 50 MCG: 50 INJECTION, SOLUTION INTRAMUSCULAR; INTRAVENOUS at 08:04

## 2024-05-29 RX ADMIN — Medication 6 MG: at 21:17

## 2024-05-29 RX ADMIN — DEXAMETHASONE SODIUM PHOSPHATE 10 MG: 10 INJECTION INTRAMUSCULAR; INTRAVENOUS at 08:03

## 2024-05-29 RX ADMIN — LIDOCAINE HYDROCHLORIDE 50 MG: 10 INJECTION, SOLUTION EPIDURAL; INFILTRATION; INTRACAUDAL; PERINEURAL at 08:03

## 2024-05-29 RX ADMIN — ONDANSETRON 4 MG: 2 INJECTION INTRAMUSCULAR; INTRAVENOUS at 11:16

## 2024-05-29 RX ADMIN — TRANEXAMIC ACID 1000 MG: 10 INJECTION, SOLUTION INTRAVENOUS at 08:38

## 2024-05-29 RX ADMIN — ROCURONIUM BROMIDE 60 MG: 10 INJECTION, SOLUTION INTRAVENOUS at 08:03

## 2024-05-29 RX ADMIN — CEFAZOLIN 3000 MG: 1 INJECTION, POWDER, FOR SOLUTION INTRAMUSCULAR; INTRAVENOUS at 17:21

## 2024-05-29 RX ADMIN — ISOSORBIDE MONONITRATE 60 MG: 60 TABLET, EXTENDED RELEASE ORAL at 14:31

## 2024-05-29 RX ADMIN — TRAMADOL HYDROCHLORIDE 50 MG: 50 TABLET, COATED ORAL at 14:30

## 2024-05-29 RX ADMIN — FENTANYL CITRATE 50 MCG: 50 INJECTION, SOLUTION INTRAMUSCULAR; INTRAVENOUS at 11:14

## 2024-05-29 RX ADMIN — BUPIVACAINE HYDROCHLORIDE 10 ML: 5 INJECTION, SOLUTION EPIDURAL; INTRACAUDAL at 07:50

## 2024-05-29 RX ADMIN — ERGOCALCIFEROL 50000 UNITS: 1.25 CAPSULE, LIQUID FILLED ORAL at 17:20

## 2024-05-29 RX ADMIN — ROCURONIUM BROMIDE 30 MG: 10 INJECTION, SOLUTION INTRAVENOUS at 10:24

## 2024-05-29 RX ADMIN — SODIUM CHLORIDE, SODIUM LACTATE, POTASSIUM CHLORIDE, AND CALCIUM CHLORIDE 125 ML/HR: .6; .31; .03; .02 INJECTION, SOLUTION INTRAVENOUS at 23:06

## 2024-05-29 RX ADMIN — ACETAMINOPHEN 650 MG: 325 TABLET, FILM COATED ORAL at 21:16

## 2024-05-29 RX ADMIN — FENTANYL CITRATE 50 MCG: 50 INJECTION, SOLUTION INTRAMUSCULAR; INTRAVENOUS at 11:16

## 2024-05-29 NOTE — ANESTHESIA PREPROCEDURE EVALUATION
Procedure:  ARTHROPLASTY SHOULDER REVERSE (Right: Shoulder)    Relevant Problems   MUSCULOSKELETAL   (+) Osteoarthritis of glenohumeral joint, right        Physical Exam    Airway    Mallampati score: III  TM Distance: >3 FB  Neck ROM: limited     Dental       Cardiovascular      Pulmonary      Other Findings        Anesthesia Plan  ASA Score- 3     Anesthesia Type- general with ASA Monitors.         Additional Monitors:     Airway Plan: ETT.           Plan Factors-Exercise tolerance (METS): >4 METS.    Chart reviewed.   Existing labs reviewed. Patient summary reviewed.    Patient is not a current smoker.      There is medical exclusion for perioperative obstructive sleep apnea risk education.        Induction- intravenous.    Postoperative Plan- Plan for postoperative opioid use.         Informed Consent- Anesthetic plan and risks discussed with patient.  I personally reviewed this patient with the CRNA. Discussed and agreed on the Anesthesia Plan with the CRNA..

## 2024-05-29 NOTE — PLAN OF CARE
Problem: PAIN - ADULT  Goal: Verbalizes/displays adequate comfort level or baseline comfort level  Description: Interventions:  - Encourage patient to monitor pain and request assistance  - Assess pain using appropriate pain scale  - Administer analgesics based on type and severity of pain and evaluate response  - Implement non-pharmacological measures as appropriate and evaluate response  - Consider cultural and social influences on pain and pain management  - Notify physician/advanced practitioner if interventions unsuccessful or patient reports new pain  Outcome: Progressing     Problem: INFECTION - ADULT  Goal: Absence or prevention of progression during hospitalization  Description: INTERVENTIONS:  - Assess and monitor for signs and symptoms of infection  - Monitor lab/diagnostic results  - Monitor all insertion sites, i.e. indwelling lines, tubes, and drains  - Monitor endotracheal if appropriate and nasal secretions for changes in amount and color  - [place] appropriate cooling/warming therapies per order  - Administer medications as ordered  - Instruct and encourage patient and family to use good hand hygiene technique  - Identify and instruct in appropriate isolation precautions for identified infection/condition  Outcome: Progressing     Problem: SAFETY ADULT  Goal: Patient will remain free of falls  Description: INTERVENTIONS:  - Educate patient/family on patient safety including physical limitations  - Instruct patient to call for assistance with activity   - Consult OT/PT to assist with strengthening/mobility   - Keep Call bell within reach  - Keep bed low and locked with side rails adjusted as appropriate  - Keep care items and personal belongings within reach  - Initiate and maintain comfort rounds  - Make Fall Risk Sign visible to staff  - Offer Toileting every 2 Hours, in advance of need  - Initiate/Maintain bed alarm  - Obtain necessary fall risk management equipment:   - Apply yellow socks and  bracelet for high fall risk patients  - Consider moving patient to room near nurses station  Outcome: Progressing  Goal: Maintain or return to baseline ADL function  Description: INTERVENTIONS:  -  Assess patient's ability to carry out ADLs; assess patient's baseline for ADL function and identify physical deficits which impact ability to perform ADLs (bathing, care of mouth/teeth, toileting, grooming, dressing, etc.)  - Assess/evaluate cause of self-care deficits   - Assess range of motion  - Assess patient's mobility; develop plan if impaired  - Assess patient's need for assistive devices and provide as appropriate  - Encourage maximum independence but intervene and supervise when necessary  - Involve family in performance of ADLs  - Assess for home care needs following discharge   - Consider OT consult to assist with ADL evaluation and planning for discharge  - Provide patient education as appropriate  Outcome: Progressing  Goal: Maintains/Returns to pre admission functional level  Description: INTERVENTIONS:  - Perform AM-PAC 6 Click Basic Mobility/ Daily Activity assessment daily.  - Set and communicate daily mobility goal to care team and patient/family/caregiver.   - Collaborate with rehabilitation services on mobility goals if consulted  - Perform Range of Motion 3 times a day.  - Reposition patient every 2 hours.  - Dangle patient 3 times a day  - Stand patient 3 times a day  - Ambulate patient 3 times a day  - Out of bed to chair 3 times a day   - Out of bed for meals 3 times a day  - Out of bed for toileting  - Record patient progress and toleration of activity level   Outcome: Progressing     Problem: DISCHARGE PLANNING  Goal: Discharge to home or other facility with appropriate resources  Description: INTERVENTIONS:  - Identify barriers to discharge w/patient and caregiver  - Arrange for needed discharge resources and transportation as appropriate  - Identify discharge learning needs (meds, wound care,  etc.)  - Arrange for interpretive services to assist at discharge as needed  - Refer to Case Management Department for coordinating discharge planning if the patient needs post-hospital services based on physician/advanced practitioner order or complex needs related to functional status, cognitive ability, or social support system  Outcome: Progressing     Problem: Knowledge Deficit  Goal: Patient/family/caregiver demonstrates understanding of disease process, treatment plan, medications, and discharge instructions  Description: Complete learning assessment and assess knowledge base.  Interventions:  - Provide teaching at level of understanding  - Provide teaching via preferred learning methods  Outcome: Progressing     Problem: MUSCULOSKELETAL - ADULT  Goal: Maintain or return mobility to safest level of function  Description: INTERVENTIONS:  - Assess patient's ability to carry out ADLs; assess patient's baseline for ADL function and identify physical deficits which impact ability to perform ADLs (bathing, care of mouth/teeth, toileting, grooming, dressing, etc.)  - Assess/evaluate cause of self-care deficits   - Assess range of motion  - Assess patient's mobility  - Assess patient's need for assistive devices and provide as appropriate  - Encourage maximum independence but intervene and supervise when necessary  - Involve family in performance of ADLs  - Assess for home care needs following discharge   - Consider OT consult to assist with ADL evaluation and planning for discharge  - Provide patient education as appropriate  Outcome: Progressing  Goal: Maintain proper alignment of affected body part  Description: INTERVENTIONS:  - Support, maintain and protect limb and body alignment  - Provide patient/ family with appropriate education  Outcome: Progressing

## 2024-05-29 NOTE — ANESTHESIA PROCEDURE NOTES
Peripheral Block    Patient location during procedure: holding area  Start time: 5/29/2024 7:45 AM  Reason for block: at surgeon's request and post-op pain management  Staffing  Performed by: Oneil Grullon MD  Authorized by: Oneil Grullon MD    Preanesthetic Checklist  Completed: patient identified, IV checked, site marked, risks and benefits discussed, surgical consent, monitors and equipment checked, pre-op evaluation and timeout performed  Peripheral Block  Patient position: supine  Prep: ChloraPrep  Patient monitoring: frequent blood pressure checks, continuous pulse oximetry and heart rate  Block type: Interscalene  Laterality: right  Injection technique: single-shot  Procedures: ultrasound guided, Ultrasound guidance required for the procedure to increase accuracy and safety of medication placement and decrease risk of complications.  Ultrasound permanent image saved  bupivacaine (PF) (MARCAINE) 0.5 % injection 20 mL - Perineural   10 mL - 5/29/2024 7:50:00 AM  bupivacaine liposomal (EXPAREL) 1.3 % injection 20 mL - Perineural   20 mL - 5/29/2024 7:50:00 AM  Needle  Needle type: Stimuplex   Needle gauge: 22 G  Needle length: 4 in  Needle localization: anatomical landmarks and ultrasound guidance  Needle insertion depth: 8 cm  Assessment  Injection assessment: incremental injection, frequent aspiration, injected with ease, negative aspiration, negative for heart rate change, no paresthesia on injection, no symptoms of intraneural/intravenous injection and needle tip visualized at all times  Paresthesia pain: none  Post-procedure:  site cleaned  patient tolerated the procedure well with no immediate complications

## 2024-05-29 NOTE — ANESTHESIA POSTPROCEDURE EVALUATION
Post-Op Assessment Note    CV Status:  Stable    Pain management: adequate       Mental Status:  Sleepy   Hydration Status:  Stable   PONV Controlled:  None   Airway Patency:  Patent  Airway: intubated  Two or more mitigation strategies used for obstructive sleep apnea   Post Op Vitals Reviewed: Yes    No anethesia notable event occurred.    Staff: CRNA               BP   152/66   Temp   98   Pulse  61   Resp   15   SpO2   98

## 2024-05-29 NOTE — OP NOTE
OPERATIVE REPORT  PATIENT NAME: Abundio Pederson    :  1950  MRN: [id number]  Pt Location: [facility] OR ROOM 04    SURGERY DATE: 2024    Surgeons and Role:     * Girish Ureña,  - Primary     * Kanchan Sands PA-C - Assisting    Preop Diagnosis:  Traumatic complete tear of right rotator cuff, subsequent encounter [S46.011D]  Full-thickness retracted supraspinatus and infraspinatus tears with high-grade partial tear of subscapularis tendon status post shoulder dislocation, mild associated glenohumeral degenerative changes    Post-Op Diagnosis Codes:     * Traumatic complete tear of right rotator cuff, subsequent encounter [S46.011D]  Full-thickness retracted supraspinatus and infraspinatus tears with high-grade partial tear of subscapularis tendon status post shoulder dislocation, mild associated glenohumeral degenerative changes    Procedure(s):  Right - ARTHROPLASTY SHOULDER REVERSE    Specimen(s):  * No specimens in log *    Estimated Blood Loss:   100 cc    Drains:  * No LDAs found *    Anesthesia Type:   General w/ Interscalene Block    Operative Indications:  Traumatic complete tear of right rotator cuff, subsequent encounter [S46.011D]  Full-thickness retracted supraspinatus and infraspinatus tears with high-grade partial tear of subscapularis tendon status post shoulder dislocation, mild associated glenohumeral degenerative changes    Operative Findings:  Full-thickness retracted supraspinatus infraspinatus tears with near full-thickness subscapularis tear, mild glenohumeral degenerative changes      Complications:   None    Procedure and Technique:  Antibiotics: 3 g Ancef  TXA: 1 g IV  Implants: Tornier 25 mm standard baseplate with 36 mm glenosphere, 6.5 x 40 mm central screw, 5.0 x 26 mm and 5.0 x 38 peripheral screws, size 3B flex stem, 6 poly      The patient was seen in the preoperative holding area and the appropriate site of surgery was confirmed and the patient was marked.  Upon bringing  patient back to the operating room he was placed supine on the operating room table and general anesthesia was provided.  Patient was then positioned in the semibeachchair position with the head elevated approximately 40 degrees and knees and hips flexed to approximately 30 degrees, respectively.  All bony prominences were appropriately padded and patient was held in place with seatbelt and tape.  The right upper extremity was prepped and draped in the usual sterile fashion.  Preoperative timeout was performed to once again confirm the site of surgery and procedure.      A 10 blade was used to make an incision along the deltopectoral interval beginning just lateral to the coracoid process and extending distally.  Bovie cautery was used to achieve hemostasis.  Full-thickness skin flaps were developed medially and laterally.  Cephalic vein was identified and Metzenbaum scissors were used to dissect the cephalic vein and mobilized it laterally.  Deltopectoral interval was bluntly developed.  Conjoined tendon was identified and clavipectoral fascia was incised lateral to the conjoined tendon.  Release of subdeltoid adhesions was performed bluntly and Brown deltoid retractor was inserted.  Pectoralis major insertion was identified in the superior 1.5 cm of the pectoralis major tendon was released.  Long head of biceps tendon was identified and tenodesed to the pectoralis major tendon.  Biceps tendon was then released and dissected proximally into the bicipital groove and rotator interval.  Residual subscapularis tendon was identified on the lesser tuberosity with high-grade tearing noted in the superior two thirds of the tendon.  0 Vicryl suture was placed in the musculotendinous junction of the remaining subscapularis for control.  Bovie cautery was used to release the remaining subscapularis tendon 10 mm from its insertion on the lesser tuberosity.  Complete release of capsule was performed while adducting, externally  rotating humerus until glenohumeral joint was dislocated and humeral head was exposed using Bharat retractors to protect the axillary nerve.  Rongeur was used to remove osteophytes from inferior humeral head.  Attention was then turned to humeral head cut.    Appropriate anatomic head cut was marked with Bovie cautery.  Reverse cut guide was then placed through the central aspect of the humerus and properly seated in 30 degrees of retroversion.  Oscillating saw was then used to make appropriate humeral head cut.  Proper cut level and version was confirmed.  Canal finder was then inserted followed by sounding up to a size 6.  Broaching began at a size 1 and proper fit was noted at a size 3B flex stem.  Stem was left in place and a protector was placed on the cut surface.  Attention was then turned to glenoid exposure and preparation.    The humerus was placed in adduction, external rotation, and gentle flexion.  Previously placed sutures and subscapularis tendon were used to mobilize the tendon.  Metzenbaum scissors were used to dissect capsule posteriorly from the subscapularis.  Subscapularis was freed superiorly, inferiorly, and posteriorly.  Anterior glenoid retractor was placed.  Biceps tendon was identified at its origin from the superior labrum.  Bovie was used to excise superior and anterior labrum along with the proximal biceps tendon.  Posterior glenoid retractor was inserted for better visualization of the posterior glenoid labrum.  Posterior labrum as well as residual inferior labrum were excised.  Capsule was released superiorly, anteriorly, and inferiorly.  Repositioning of retractors was performed to better visualize the glenoid surface.  A guide was used corresponding to a 25 mm baseplate to visualize the appropriate position of pin insertion which was templated preoperatively.  This was confirmed by measuring from the inferior and anterior glenoid margins.  Upon achieving proper positioning and  trajectory of guidepin, guidepin was inserted bicortically.  This was followed by reaming.  Once appropriate reaming depth was achieved, appropriate  was placed and noted to sit properly.  Central 10 mm reamer was then used and both the reamer and pin were removed.  Drill guide was used for 6.5 mm central screw and drilled bicortically.  The appropriate screw length was measured to be 40 mm.  Surgical site was copiously irrigated.  Appropriate baseplate and central screw were assembled on the back table and then inserted.  Appropriate compression and fixation were achieved.  Peripheral screws were then drilled in the superior and inferior holes and appropriately measured screws of 26 and 38 mm, respectively, were inserted.  Surgical site was once again copiously irrigated.  Peripheral reamer was then utilized followed by insertion of 36 mm glenosphere.  Upon proper fixation of glenoid components, attention was turned back to humeral stem fixation.    High offset tray was inserted on the stem to ensure proper coverage of the cut surface.  This followed by a 6 mm trial poly.  Trial prosthesis was noted to be stable without impingement through forward flexion to beyond 120 degrees, abduction to beyond 100 degrees, internal rotation to the torso with 0 degrees abduction and 70 degrees at 90 degrees abduction, external rotation to 70 degrees at the side and 70 degrees and 90 degrees of abduction.  Humeral component was then dislocated and trial components were removed.  Surgical site was copiously irrigated.      Upon near full impaction of the stem, high offset tray was impacted corresponding to the appropriate alignment and the trial.  Proper stability was noted.  Final size 6 polyethylene was impacted and noted to be stable.  Humeral component was reduced and once again noted to be stable throughout range of motion. Surgical site was copiously irrigated.  Vancomycin powder was inserted into the surgical  site.    Due to high-grade tearing of subscapularis tendon with poor residual tissue quality, decision was made not to repair the subscapularis tendon.  Deltopectoral interval was closed with 2-0 Vicryl suture followed by skin closure with 2-0 Vicryl and 4-0 Monocryl.  Steri-Strips were applied followed by sterile dressing and sling and abduction pillow.  The patient tolerated the procedure well and was transferred to PACU without complication.    I was present for the entire procedure.  No qualified resident was available for the surgical case.  Physician's assistant, Kanchan Sands PA-C was needed for assistance with patient positioning, soft tissue retraction and handling, dissection, and suturing.    Postoperatively the patient will be nonweightbearing right upper extremity with sling and abduction pillow.  He will obtain postoperative x-rays in PACU.  Patient will receive Ancef 3 g every 8 hours x 2 doses postoperatively.  They are instructed to take aspirin 81 mg twice daily beginning on postoperative day 1 for DVT prophylaxis for 4 weeks.  I will see the patient in the office as scheduled on 6/10/2024 for suture removal and new x-rays of right shoulder.        Patient Disposition:  PACU         SIGNATURE: Girish Ureña DO  DATE: May 29, 2024  TIME: 11:24 AM

## 2024-05-29 NOTE — CASE MANAGEMENT
Case Management Progress Note    Patient name Abundio Pederson  Location 2 EAST 258/2 E 258-01 MRN [id number]  : 1950 Date 2024       LOS (days): 0  Geometric Mean LOS (GMLOS) (days):   Days to GMLOS:        OBJECTIVE:        Current admission status: Outpatient Surgery  Preferred Pharmacy:   [id number] - [place], [place] - [address] [place] [address]  Phone: [phone] Fax: [phone]    Primary Care Provider: Diane Hickey PA-C    Primary Insurance: [employer]  Secondary Insurance: [employer]    PROGRESS NOTE:    CM attempted to meet with patient at bedside to review DME consult. Provider present at bedside. CM to come back later.

## 2024-05-30 VITALS
BODY MASS INDEX: 35.06 KG/M2 | SYSTOLIC BLOOD PRESSURE: 118 MMHG | WEIGHT: 250.44 LBS | HEIGHT: 71 IN | TEMPERATURE: 97.7 F | DIASTOLIC BLOOD PRESSURE: 56 MMHG | RESPIRATION RATE: 17 BRPM | HEART RATE: 55 BPM | OXYGEN SATURATION: 94 %

## 2024-05-30 PROBLEM — Z96.611 S/P REVERSE TOTAL SHOULDER ARTHROPLASTY, RIGHT: Status: ACTIVE | Noted: 2024-05-30

## 2024-05-30 LAB
ALBUMIN SERPL BCP-MCNC: 3.2 G/DL (ref 3.5–5)
ALP SERPL-CCNC: 58 U/L (ref 34–104)
ALT SERPL W P-5'-P-CCNC: 24 U/L (ref 7–52)
ANION GAP SERPL CALCULATED.3IONS-SCNC: 7 MMOL/L (ref 4–13)
AST SERPL W P-5'-P-CCNC: 39 U/L (ref 13–39)
BILIRUB SERPL-MCNC: 0.39 MG/DL (ref 0.2–1)
BUN SERPL-MCNC: 21 MG/DL (ref 5–25)
CALCIUM ALBUM COR SERPL-MCNC: 8.9 MG/DL (ref 8.3–10.1)
CALCIUM SERPL-MCNC: 8.3 MG/DL (ref 8.4–10.2)
CHLORIDE SERPL-SCNC: 107 MMOL/L (ref 96–108)
CO2 SERPL-SCNC: 23 MMOL/L (ref 21–32)
CREAT SERPL-MCNC: 0.84 MG/DL (ref 0.6–1.3)
ERYTHROCYTE [DISTWIDTH] IN BLOOD BY AUTOMATED COUNT: 13.4 % (ref 11.6–15.1)
GFR SERPL CREATININE-BSD FRML MDRD: 88 ML/MIN/1.73SQ M
GLUCOSE P FAST SERPL-MCNC: 165 MG/DL (ref 65–99)
GLUCOSE SERPL-MCNC: 165 MG/DL (ref 65–140)
HCT VFR BLD AUTO: 34 % (ref 36.5–49.3)
HGB BLD-MCNC: 11.3 G/DL (ref 12–17)
MCH RBC QN AUTO: 31.1 PG (ref 26.8–34.3)
MCHC RBC AUTO-ENTMCNC: 33.2 G/DL (ref 31.4–37.4)
MCV RBC AUTO: 94 FL (ref 82–98)
PLATELET # BLD AUTO: 243 THOUSANDS/UL (ref 149–390)
PMV BLD AUTO: 10.2 FL (ref 8.9–12.7)
POTASSIUM SERPL-SCNC: 4.4 MMOL/L (ref 3.5–5.3)
PROT SERPL-MCNC: 6.1 G/DL (ref 6.4–8.4)
RBC # BLD AUTO: 3.63 MILLION/UL (ref 3.88–5.62)
SODIUM SERPL-SCNC: 137 MMOL/L (ref 135–147)
WBC # BLD AUTO: 13.19 THOUSAND/UL (ref 4.31–10.16)

## 2024-05-30 PROCEDURE — 97535 SELF CARE MNGMENT TRAINING: CPT

## 2024-05-30 PROCEDURE — NC001 PR NO CHARGE: Performed by: ORTHOPAEDIC SURGERY

## 2024-05-30 PROCEDURE — 97162 PT EVAL MOD COMPLEX 30 MIN: CPT

## 2024-05-30 PROCEDURE — 97167 OT EVAL HIGH COMPLEX 60 MIN: CPT

## 2024-05-30 PROCEDURE — 99024 POSTOP FOLLOW-UP VISIT: CPT | Performed by: ORTHOPAEDIC SURGERY

## 2024-05-30 RX ORDER — OXYCODONE HYDROCHLORIDE 5 MG/1
5 TABLET ORAL EVERY 4 HOURS PRN
Qty: 20 TABLET | Refills: 0 | Status: SHIPPED | OUTPATIENT
Start: 2024-05-30

## 2024-05-30 RX ORDER — DOCUSATE SODIUM 100 MG/1
100 CAPSULE, LIQUID FILLED ORAL 2 TIMES DAILY
Qty: 40 CAPSULE | Refills: 0 | Status: SHIPPED | OUTPATIENT
Start: 2024-05-30

## 2024-05-30 RX ORDER — CELECOXIB 100 MG/1
100 CAPSULE ORAL 2 TIMES DAILY
Qty: 40 CAPSULE | Refills: 0 | Status: SHIPPED | OUTPATIENT
Start: 2024-05-30

## 2024-05-30 RX ORDER — ASPIRIN 81 MG/1
81 TABLET ORAL 2 TIMES DAILY
Qty: 60 TABLET | Refills: 0 | Status: SHIPPED | OUTPATIENT
Start: 2024-05-30

## 2024-05-30 RX ORDER — ACETAMINOPHEN 325 MG/1
650 TABLET ORAL EVERY 6 HOURS SCHEDULED
Qty: 60 TABLET | Refills: 0 | Status: SHIPPED | OUTPATIENT
Start: 2024-05-30

## 2024-05-30 RX ADMIN — ACETAMINOPHEN 650 MG: 325 TABLET, FILM COATED ORAL at 02:13

## 2024-05-30 RX ADMIN — ISOSORBIDE MONONITRATE 60 MG: 60 TABLET, EXTENDED RELEASE ORAL at 09:12

## 2024-05-30 RX ADMIN — BUDESONIDE AND FORMOTEROL FUMARATE DIHYDRATE 2 PUFF: 160; 4.5 AEROSOL RESPIRATORY (INHALATION) at 09:12

## 2024-05-30 RX ADMIN — Medication 250 MG: at 09:12

## 2024-05-30 RX ADMIN — FAMOTIDINE 20 MG: 20 TABLET, FILM COATED ORAL at 09:12

## 2024-05-30 RX ADMIN — ACETAMINOPHEN 650 MG: 325 TABLET, FILM COATED ORAL at 09:12

## 2024-05-30 RX ADMIN — TRAMADOL HYDROCHLORIDE 50 MG: 50 TABLET, COATED ORAL at 09:12

## 2024-05-30 RX ADMIN — CEFAZOLIN 3000 MG: 1 INJECTION, POWDER, FOR SOLUTION INTRAMUSCULAR; INTRAVENOUS at 00:15

## 2024-05-30 RX ADMIN — METOPROLOL TARTRATE 50 MG: 50 TABLET, FILM COATED ORAL at 09:12

## 2024-05-30 RX ADMIN — ASPIRIN 81 MG: 81 TABLET, COATED ORAL at 09:12

## 2024-05-30 RX ADMIN — KETOROLAC TROMETHAMINE 15 MG: 30 INJECTION, SOLUTION INTRAMUSCULAR; INTRAVENOUS at 05:33

## 2024-05-30 RX ADMIN — TRAMADOL HYDROCHLORIDE 50 MG: 50 TABLET, COATED ORAL at 02:13

## 2024-05-30 NOTE — DISCHARGE SUMMARY
ORTHOPEDICS DISCHARGE SUMMARY  Abundio Pederson 73 y.o. male MRN: [id number]  Unit/Bed#: [address] [place] [address]    Attending Physician: Dr Ureña    Admitting diagnosis: Traumatic complete tear of right rotator cuff, subsequent encounter [S46.011D]    Discharge diagnosis: Traumatic complete tear of right rotator cuff, subsequent encounter [S46.011D]    Date of admission: 5/29/2024    Date of discharge: 05/30/24         Procedure: Right reverse shoulder arthroplasty    HPI:  This is a 73 y.o. year old male that presented to the office with signs and symptoms of right shoulder osteoarthritis and/or other pathology. They tried and failed conservative treatment measures and wished to proceed with surgical intervention. The risks, benefits, and complications of the procedure were discussed with the patient and informed consent was obtained.    Hospital Course:  The patient was admitted to the hospital on 5/29/2024 and underwent an uncomplicated right reverse total shoulder arthroplasty. They were transferred to the floor after a brief stay in the post-anesthesia care unit. Their pain was well managed with IV and oral pain medications. On discharge date pt was cleared by PT and the medicine team and determined to be safe for discharge    0   Lab Value Date/Time    HGB 11.3 (L) 05/29/2024 2359    HGB 12.9 04/01/2024 1440     Vital signs remained stable and pt was resuscitated with IVF as needed   Body mass index is 34.93 kg/m². mildly obese. Recommend behavior modifications and nutrition.    Discharge Instructions:  The patient was discharged nonweight bearing to the right upper extremity. Sling intact at all times except for hygenic purposes and outpatient PT sessions. Take pain medications as instructed.    Discharge Medications:  For the complete list of discharge medications, please refer to the patient's medication reconciliation.

## 2024-05-30 NOTE — CASE MANAGEMENT
Case Management Progress Note    Patient name Abundio Pederson  Location 2 EAST 258/2 E 258-01 MRN [id number]  : 1950 Date 2024       LOS (days): 0  Geometric Mean LOS (GMLOS) (days):   Days to GMLOS:        OBJECTIVE:     Current admission status: Outpatient Surgery  Preferred Pharmacy:   [id number] - [place], [place] - [address] [address]  Phone: [phone] Fax: [phone]    Primary Care Provider: Diane Hickey PA-C  Primary Insurance: [employer]  Secondary Insurance: [facility]    PROGRESS NOTE:  CM met with patient at bedside to review CM consult for possible DME needs.  Patient states he has walker, cane, handicapped accessible toilet, grab bars, and a reacher already at home.  Denies any CM needs.  No HHC or OP therapy until cleared by ortho at follow-up OP appt.  CM will remain available through to d/c for needs.  Patient states family will be transporting him home upon d/c. Reports being pleased with care received during his stay.

## 2024-05-30 NOTE — PROGRESS NOTES
Progress Note - Orthopedics   Abundio Pederson 73 y.o. male MRN: [id number]  Unit/Bed#: 2 [facility] 258-      Subjective:    73 y.o.male POD#1 s/p Right reverse shoulder arthroplasty, resting in bed and in no acute distress. Today, patient reports he is feeling pretty good. Patient admits his pain is well controlled. He does admit to numbness in his thumb. Denies chest pain or shortness of breath.      Labs:  0   Lab Value Date/Time    HCT 34.0 (L) 05/29/2024 2359    HCT 39.9 04/01/2024 1440    HGB 11.3 (L) 05/29/2024 2359    HGB 12.9 04/01/2024 1440    PT 14.3 08/19/2020 1415    INR 1.08 04/01/2024 1440    INR 1.11 08/19/2020 1415    WBC 13.19 (H) 05/29/2024 2359    WBC 9.57 04/01/2024 1440    CRP 6.6 (H) 04/01/2024 1440       Meds:    Current Facility-Administered Medications:     acetaminophen (TYLENOL) tablet 650 mg, 650 mg, Oral, Q6H MARYELLEN, Kanchan Sands PA-C, 650 mg at 05/30/24 0213    albuterol (PROVENTIL HFA,VENTOLIN HFA) inhaler 1 puff, 1 puff, Inhalation, Once PRN, Kanchan Sands PA-C    albuterol inhalation solution 2.5 mg, 2.5 mg, Nebulization, Daily PRN, Kanchan Sands PA-C    aspirin (ECOTRIN LOW STRENGTH) EC tablet 81 mg, 81 mg, Oral, BID, Kanchan Sands PA-C    atorvastatin (LIPITOR) tablet 40 mg, 40 mg, Oral, HS, Kanchan Sands PA-C, 40 mg at 05/29/24 2117    budesonide-formoterol (SYMBICORT) 160-4.5 mcg/act inhaler 2 puff, 2 puff, Inhalation, BID, Kanchan Sands PA-C, 2 puff at 05/29/24 1721    docusate sodium (COLACE) capsule 100 mg, 100 mg, Oral, BID, Kanchan Sands PA-C, 100 mg at 05/29/24 1720    ergocalciferol (VITAMIN D2) capsule 50,000 Units, 50,000 Units, Oral, Weekly, Kanchan Sands PA-C, 50,000 Units at 05/29/24 1720    famotidine (PEPCID) tablet 20 mg, 20 mg, Oral, BID, Kanchan Sands PA-C, 20 mg at 05/29/24 1720    furosemide (LASIX) tablet 40 mg, 40 mg, Oral, BID, Kanchan Sands PA-C    isosorbide mononitrate (IMDUR) 24 hr tablet 60 mg, 60 mg, Oral, Daily, Kanchan Sands PA-C, 60  "mg at 05/29/24 1431    lactated ringers bolus 1,000 mL, 1,000 mL, Intravenous, Once PRN **AND** lactated ringers bolus 1,000 mL, 1,000 mL, Intravenous, Once PRN, DANIELA Franco-ELVI    lactated ringers infusion, 125 mL/hr, Intravenous, Continuous, DANIELA Franco-ELVI, Last Rate: 125 mL/hr at 05/29/24 2306, 125 mL/hr at 05/29/24 2306    magnesium gluconate (MAGONATE) tablet 250 mg, 250 mg, Oral, Daily, DANIELA Franco-C, 250 mg at 05/29/24 1431    melatonin tablet 6 mg, 6 mg, Oral, HS, DANIELA Franco-C, 6 mg at 05/29/24 2117    metoprolol tartrate (LOPRESSOR) tablet 50 mg, 50 mg, Oral, Daily, DANIELA Franco-C, 50 mg at 05/29/24 1431    oxyCODONE (ROXICODONE) immediate release tablet 10 mg, 10 mg, Oral, Q4H PRN, DANIELA Franco-ELVI    oxyCODONE (ROXICODONE) IR tablet 5 mg, 5 mg, Oral, Q4H PRN, DANIELA Franco-ELVI    potassium chloride oral solution 20 mEq, 20 mEq, Oral, Daily, Kanchan Sands PA-C    sodium chloride 0.9 % bolus 1,000 mL, 1,000 mL, Intravenous, Once PRN **AND** sodium chloride 0.9 % bolus 1,000 mL, 1,000 mL, Intravenous, Once PRN, DANIELA Franco-C    traMADol (ULTRAM) tablet 50 mg, 50 mg, Oral, Q6H MARYELLEN, DANIELA Franco-C, 50 mg at 05/30/24 0213    Blood Culture:   No results found for: \"BLOODCX\"    Wound Culture:   No results found for: \"WOUNDCULT\"    Ins and Outs:  I/O last 24 hours:  In: 2800 [I.V.:2800]  Out: 400 [Urine:300; Blood:100]          Physical:  Vitals:    05/30/24 0300   Temp: 97.9 °F (36.6 °C)   SpO2:      Musculoskeletal:    Right shoulder  Dressing clean, dry and intact  Approximately 3cm short of full composite fist  Sensation intact to median/radial/ulnar nerve distribution   Motor intact anterior interosseous nerve/posterior interosseous nerve/median/radial/ulnar/axillary nerve distributions  Deltoid contraction appreciated  2+ radial pulse  Digits warm and well perfused  Capillary refill < 2 seconds    Assessment:    73 y.o.male POD#1 s/p Right reverse " shoulder arthroplasty     Plan:  NWB RUE, sling intact at all times. ROM of fingers and wrist within confines of sling.   Will monitor for ABLA and administer IVF/prbc as indicated for Greater than 2 gram drop or Hgb < 7. HGB last night a midnight 11.3.   PT/OT  Pain control  DVT ppx- ASA 81mg twice daily for 4 weeks post operatively   Dispo: Discharge planning    Kanchan Sands PA-C

## 2024-05-30 NOTE — DISCHARGE INSTR - AVS FIRST PAGE
Discharge Instructions - Orthopedics  Abundio Pederson 73 y.o. male MRN: [id number]  Unit/Bed#: [address] [place] [address]    Weight Bearing Status:                                           Nonweightbearing Right upper extremity.  Sling intact at all times except for hygenic purposes    DVT prophylaxis:  Aspirin 81mg twice daily for 4 weeks post operatively     Pain:  Continue analgesics as directed. Please take with food and water.     Dressing Instructions:   Please keep clean, dry and intact for 7 days post operatively. May shower during this time with dressing intact. If dressing becomes saturated in the shower, please remove immediately. Allow warm, soapy water to run over dressing/surgical incision. No scrubbing, no soaking the shoulder. Do not remove sutures or steri strips. No application of ointments or creams over or near the incision. Replace sling after showering and maintain throughout the day and when sleeping.     Appt Instructions:   Follow up in office with Dr Ureña on 06/10/2024    Contact the office sooner if you experience any increased numbness/tingling in the extremities.

## 2024-05-30 NOTE — PLAN OF CARE
Problem: PAIN - ADULT  Goal: Verbalizes/displays adequate comfort level or baseline comfort level  Description: Interventions:  - Encourage patient to monitor pain and request assistance  - Assess pain using appropriate pain scale  - Administer analgesics based on type and severity of pain and evaluate response  - Implement non-pharmacological measures as appropriate and evaluate response  - Consider cultural and social influences on pain and pain management  - Notify physician/advanced practitioner if interventions unsuccessful or patient reports new pain  Outcome: Progressing     Problem: INFECTION - ADULT  Goal: Absence or prevention of progression during hospitalization  Description: INTERVENTIONS:  - Assess and monitor for signs and symptoms of infection  - Monitor lab/diagnostic results  - Monitor all insertion sites, i.e. indwelling lines, tubes, and drains  - Monitor endotracheal if appropriate and nasal secretions for changes in amount and color  - [place] appropriate cooling/warming therapies per order  - Administer medications as ordered  - Instruct and encourage patient and family to use good hand hygiene technique  - Identify and instruct in appropriate isolation precautions for identified infection/condition  Outcome: Progressing     Problem: SAFETY ADULT  Goal: Patient will remain free of falls  Description: INTERVENTIONS:  - Educate patient/family on patient safety including physical limitations  - Instruct patient to call for assistance with activity   - Consult OT/PT to assist with strengthening/mobility   - Keep Call bell within reach  - Keep bed low and locked with side rails adjusted as appropriate  - Keep care items and personal belongings within reach  - Initiate and maintain comfort rounds  - Make Fall Risk Sign visible to staff  - Offer Toileting every 2 Hours, in advance of need  - Initiate/Maintain bed alarm  - Obtain necessary fall risk management equipment:   - Apply yellow socks and  bracelet for high fall risk patients  - Consider moving patient to room near nurses station  Outcome: Progressing  Goal: Maintain or return to baseline ADL function  Description: INTERVENTIONS:  -  Assess patient's ability to carry out ADLs; assess patient's baseline for ADL function and identify physical deficits which impact ability to perform ADLs (bathing, care of mouth/teeth, toileting, grooming, dressing, etc.)  - Assess/evaluate cause of self-care deficits   - Assess range of motion  - Assess patient's mobility; develop plan if impaired  - Assess patient's need for assistive devices and provide as appropriate  - Encourage maximum independence but intervene and supervise when necessary  - Involve family in performance of ADLs  - Assess for home care needs following discharge   - Consider OT consult to assist with ADL evaluation and planning for discharge  - Provide patient education as appropriate  Outcome: Progressing  Goal: Maintains/Returns to pre admission functional level  Description: INTERVENTIONS:  - Perform AM-PAC 6 Click Basic Mobility/ Daily Activity assessment daily.  - Set and communicate daily mobility goal to care team and patient/family/caregiver.   - Collaborate with rehabilitation services on mobility goals if consulted  - Perform Range of Motion 3 times a day.  - Reposition patient every 2 hours.  - Dangle patient 3 times a day  - Stand patient 3 times a day  - Ambulate patient 3 times a day  - Out of bed to chair 3 times a day   - Out of bed for meals 3 times a day  - Out of bed for toileting  - Record patient progress and toleration of activity level   Outcome: Progressing     Problem: DISCHARGE PLANNING  Goal: Discharge to home or other facility with appropriate resources  Description: INTERVENTIONS:  - Identify barriers to discharge w/patient and caregiver  - Arrange for needed discharge resources and transportation as appropriate  - Identify discharge learning needs (meds, wound care,  etc.)  - Arrange for interpretive services to assist at discharge as needed  - Refer to Case Management Department for coordinating discharge planning if the patient needs post-hospital services based on physician/advanced practitioner order or complex needs related to functional status, cognitive ability, or social support system  Outcome: Progressing     Problem: Knowledge Deficit  Goal: Patient/family/caregiver demonstrates understanding of disease process, treatment plan, medications, and discharge instructions  Description: Complete learning assessment and assess knowledge base.  Interventions:  - Provide teaching at level of understanding  - Provide teaching via preferred learning methods  Outcome: Progressing     Problem: MUSCULOSKELETAL - ADULT  Goal: Maintain or return mobility to safest level of function  Description: INTERVENTIONS:  - Assess patient's ability to carry out ADLs; assess patient's baseline for ADL function and identify physical deficits which impact ability to perform ADLs (bathing, care of mouth/teeth, toileting, grooming, dressing, etc.)  - Assess/evaluate cause of self-care deficits   - Assess range of motion  - Assess patient's mobility  - Assess patient's need for assistive devices and provide as appropriate  - Encourage maximum independence but intervene and supervise when necessary  - Involve family in performance of ADLs  - Assess for home care needs following discharge   - Consider OT consult to assist with ADL evaluation and planning for discharge  - Provide patient education as appropriate  Outcome: Progressing  Goal: Maintain proper alignment of affected body part  Description: INTERVENTIONS:  - Support, maintain and protect limb and body alignment  - Provide patient/ family with appropriate education  Outcome: Progressing

## 2024-05-30 NOTE — PLAN OF CARE
Problem: PAIN - ADULT  Goal: Verbalizes/displays adequate comfort level or baseline comfort level  Description: Interventions:  - Encourage patient to monitor pain and request assistance  - Assess pain using appropriate pain scale  - Administer analgesics based on type and severity of pain and evaluate response  - Implement non-pharmacological measures as appropriate and evaluate response  - Consider cultural and social influences on pain and pain management  - Notify physician/advanced practitioner if interventions unsuccessful or patient reports new pain  Outcome: Progressing     Problem: INFECTION - ADULT  Goal: Absence or prevention of progression during hospitalization  Description: INTERVENTIONS:  - Assess and monitor for signs and symptoms of infection  - Monitor lab/diagnostic results  - Monitor all insertion sites, i.e. indwelling lines, tubes, and drains  - Monitor endotracheal if appropriate and nasal secretions for changes in amount and color  - [place] appropriate cooling/warming therapies per order  - Administer medications as ordered  - Instruct and encourage patient and family to use good hand hygiene technique  - Identify and instruct in appropriate isolation precautions for identified infection/condition  Outcome: Progressing     Problem: SAFETY ADULT  Goal: Patient will remain free of falls  Description: INTERVENTIONS:  - Educate patient/family on patient safety including physical limitations  - Instruct patient to call for assistance with activity   - Consult OT/PT to assist with strengthening/mobility   - Keep Call bell within reach  - Keep bed low and locked with side rails adjusted as appropriate  - Keep care items and personal belongings within reach  - Initiate and maintain comfort rounds  - Make Fall Risk Sign visible to staff  - Offer Toileting every  Hours, in advance of need  - Initiate/Maintainalarm  - Obtain necessary fall risk management equipment:   - Apply yellow socks and  bracelet for high fall risk patients  - Consider moving patient to room near nurses station  Outcome: Progressing  Goal: Maintain or return to baseline ADL function  Description: INTERVENTIONS:  -  Assess patient's ability to carry out ADLs; assess patient's baseline for ADL function and identify physical deficits which impact ability to perform ADLs (bathing, care of mouth/teeth, toileting, grooming, dressing, etc.)  - Assess/evaluate cause of self-care deficits   - Assess range of motion  - Assess patient's mobility; develop plan if impaired  - Assess patient's need for assistive devices and provide as appropriate  - Encourage maximum independence but intervene and supervise when necessary  - Involve family in performance of ADLs  - Assess for home care needs following discharge   - Consider OT consult to assist with ADL evaluation and planning for discharge  - Provide patient education as appropriate  Outcome: Progressing  Goal: Maintains/Returns to pre admission functional level  Description: INTERVENTIONS:  - Perform AM-PAC 6 Click Basic Mobility/ Daily Activity assessment daily.  - Set and communicate daily mobility goal to care team and patient/family/caregiver.   - Collaborate with rehabilitation services on mobility goals if consulted  - Perform Range of Motion  times a day.  - Reposition patient every  hours.  - Dangle patient  times a day  - Stand patient times a day  - Ambulate patient times a day  - Out of bed to chair times a day   - Out of bed for meals  times a day  - Out of bed for toileting  - Record patient progress and toleration of activity level   Outcome: Progressing     Problem: DISCHARGE PLANNING  Goal: Discharge to home or other facility with appropriate resources  Description: INTERVENTIONS:  - Identify barriers to discharge w/patient and caregiver  - Arrange for needed discharge resources and transportation as appropriate  - Identify discharge learning needs (meds, wound care, etc.)  -  Arrange for interpretive services to assist at discharge as needed  - Refer to Case Management Department for coordinating discharge planning if the patient needs post-hospital services based on physician/advanced practitioner order or complex needs related to functional status, cognitive ability, or social support system  Outcome: Progressing     Problem: Knowledge Deficit  Goal: Patient/family/caregiver demonstrates understanding of disease process, treatment plan, medications, and discharge instructions  Description: Complete learning assessment and assess knowledge base.  Interventions:  - Provide teaching at level of understanding  - Provide teaching via preferred learning methods  Outcome: Progressing     Problem: MUSCULOSKELETAL - ADULT  Goal: Maintain or return mobility to safest level of function  Description: INTERVENTIONS:  - Assess patient's ability to carry out ADLs; assess patient's baseline for ADL function and identify physical deficits which impact ability to perform ADLs (bathing, care of mouth/teeth, toileting, grooming, dressing, etc.)  - Assess/evaluate cause of self-care deficits   - Assess range of motion  - Assess patient's mobility  - Assess patient's need for assistive devices and provide as appropriate  - Encourage maximum independence but intervene and supervise when necessary  - Involve family in performance of ADLs  - Assess for home care needs following discharge   - Consider OT consult to assist with ADL evaluation and planning for discharge  - Provide patient education as appropriate  Outcome: Progressing  Goal: Maintain proper alignment of affected body part  Description: INTERVENTIONS:  - Support, maintain and protect limb and body alignment  - Provide patient/ family with appropriate education  Outcome: Progressing

## 2024-05-30 NOTE — OCCUPATIONAL THERAPY NOTE
"Occupational Therapy Evaluation      Abundio VILLA Dacia    5/30/2024    Patient Active Problem List   Diagnosis    Osteoarthritis of glenohumeral joint, right    Traumatic complete tear of right rotator cuff, subsequent encounter    S/P reverse total shoulder arthroplasty, right       Past Medical History:   Diagnosis Date    Arthritis     Asthma     CAD (coronary artery disease)     CHF (congestive heart failure) ([facility])     COPD (chronic obstructive pulmonary disease) ([facility])     CPAP (continuous positive airway pressure) dependence     GERD (gastroesophageal reflux disease)     Heart attack ([facility])     2016    Hyperlipidemia     Hypertension     Myocardial infarction ([facility])     5/6/2016    Pneumonia     Sleep apnea        Past Surgical History:   Procedure Laterality Date    APPENDECTOMY      CARDIAC SURGERY  05/07/2016    COSMETIC SURGERY      \"face re built\"    HERNIA REPAIR      [place] ARTHROPLASTY GLENOHUMERAL JOINT TOTAL SHOULDER Right 5/29/2024    Procedure: ARTHROPLASTY SHOULDER REVERSE;  Surgeon: Girish Ureña DO;  Location: [facility] OR;  Service: Orthopedics        05/30/24 0821   OT Last Visit   OT Visit Date 05/30/24   Note Type   Note type Evaluation   Additional Comments Pt agreeable to OT eval. Upon arrival pt supine in bed with HOB elevated.   Pain Assessment   Pain Assessment Tool 0-10   Pain Score No Pain   Restrictions/Precautions   Weight Bearing Precautions Per Order Yes   RUE Weight Bearing Per Order NWB  (per ortho)   Braces or Orthoses Sling  (R shoulder sling with abduction pillow intact at all times per ortho)   Other Precautions Fall Risk;WBS;Chair Alarm;Bed Alarm   Home Living   Type of Home House   Home Layout Two level;1/2 bath on main level;Stairs to enter with rails  (1 ABEL; 11 steps to 2nd floor)   Bathroom Shower/Tub Tub/shower unit   Bathroom Toilet Raised   Bathroom Equipment Grab bars in shower;Grab bars around toilet;Shower chair   Bathroom Accessibility Accessible   Home Equipment " Walker;Cane;Reacher;Sock aid   Additional Comments Pt reports planning on staying on 1st floor upon discharge   Prior Function   Level of [place] Needs assistance with IADLS;Independent with ADLs;Independent with functional mobility  (A with IADLs since injuring shoulder 9/2023)   Lives With Spouse   Receives Help From Family  (daughter coming to stay for short time)   IADLs Family/Friend/Other provides transportation;Family/Friend/Other provides meals;Independent with medication management   Falls in the last 6 months 1 to 4  (2 per patient)   Vocational Full time employment  ()   Lifestyle   Autonomy Patient reporting being independent with ADLs, ambulatory with no AD vs SPC, and lives with wife   Reciprocal Relationships Wife   Service to Others Works full-time   Intrinsic Gratification Yard work/ mowing   ADL   Eating Assistance 5  Supervision/Setup   Grooming Assistance 5  Supervision/Setup   UB Bathing Assistance 3  Moderate Assistance   LB Bathing Assistance 3  Moderate Assistance   UB Dressing Assistance 3  Moderate Assistance   LB Dressing Assistance 3  Moderate Assistance   Toileting Assistance  3  Moderate Assistance   Additional Comments ADL levels based on functional performance during OT eval.   Bed Mobility   Supine to Sit 5  Supervision   Additional items Assist x 1;HOB elevated;Increased time required;Verbal cues   Sit to Supine   (DNT: pt seated OOB in recliner at end of session)   Transfers   Sit to Stand   (CGA)   Additional items Assist x 1;Bedrails;Increased time required;Verbal cues   Stand to Sit   (CGA)   Additional items Assist x 1;Bedrails;Increased time required;Verbal cues   Functional Mobility   Functional Mobility   (CGA)   Additional Comments Pt ambulated household distance in hallway with no overt SOB/LOB. Pt denied pain and no AD used.   Balance   Static Sitting Normal   Dynamic Sitting Good   Static Standing Fair +   Dynamic Standing Fair +   Activity Tolerance  "  Activity Tolerance Patient tolerated treatment well   Medical Staff Made Aware Pt seen as a co-eval with PT due to the patient's co-morbidities and clinically unstable presentation indicated by chart review.   RUE Assessment   RUE Assessment X  (\"ROM of fingers and wrist within confines of sling\" per ortho)   LUE Assessment   LUE Assessment WFL  (full AROM,)   Hand Function   Gross Motor Coordination Functional   Fine Motor Coordination Functional   Cognition   Overall Cognitive Status WFL   Arousal/Participation Alert;Responsive;Cooperative   Attention Within functional limits   Orientation Level Oriented X4   Memory Within functional limits   Following Commands Follows all commands and directions without difficulty   Assessment   Limitation Decreased ADL status;Decreased UE ROM;Decreased UE strength;Decreased endurance;Decreased self-care trans;Decreased high-level ADLs   Prognosis Good   Assessment Patient is a 73 y.o. male seen for OT evaluation s/p admit to [facility]  on 5/29/2024 w/S/P reverse total shoulder arthroplasty, right. Orders placed for OT evaluation and treatment and up as tolerated. Performed at least two patient identifiers during session including name and wristband.  Prior to admission, Patient reporting being independent with ADLs, ambulatory with no AD vs SPC, and lives with wife. Personal factors affecting patient at time of initial evaluation include: steps to enter, difficulty performing ADLs, and difficulty performing IADLs. Upon evaluation, patient requires moderate assist for UB ADLs, moderate assist for LB ADLs, transfers and functional ambulation in room and bathroom with contact guard assist, with the use of  no AD .  Patient is oriented x 4.  Occupational performance is affected by the following deficits: limited active ROM, decreased muscle strength, dynamic sit/ stand balance deficit with poor standing tolerance time for self care and functional mobility, decreased " activity tolerance, orthopedic restrictions, and delayed righting and equilibrium reactions. Patient to benefit from continued Occupational Therapy treatment while in the hospital to address deficits as defined above and maximize level of functional independence with ADLs and functional mobility. Occupational Performance areas to address include: eating, grooming , bathing/ shower, dressing, toilet hygiene, transfer to all surfaces, and functional ambulation. From OT standpoint, recommendation at time of d/c would be Level III (minimim resource intensity).   Goals   Patient Goals to go home   Plan   Treatment Interventions ADL retraining;Functional transfer training;UE strengthening/ROM;Endurance training;Patient/family training;Equipment evaluation/education;Fine motor coordination activities;Compensatory technique education;Activityengagement   Goal Expiration Date 06/09/24   OT Treatment Day 1   OT Frequency 2-3x/wk   Discharge Recommendation   Rehab Resource Intensity Level, OT III (Minimum Resource Intensity)   AM-PAC Daily Activity Inpatient   Lower Body Dressing 2   Bathing 2   Toileting 2   Upper Body Dressing 2   Grooming 3   Eating 3   Daily Activity Raw Score 14   Daily Activity Standardized Score (Calc for Raw Score >=11) 33.39   AM-PAC Applied Cognition Inpatient   Following a Speech/Presentation 4   Understanding Ordinary Conversation 4   Taking Medications 4   Remembering Where Things Are Placed or Put Away 4   Remembering List of 4-5 Errands 4   Taking Care of Complicated Tasks 4   Applied Cognition Raw Score 24   Applied Cognition Standardized Score 62.21   Modified [place] Scale   Modified [place] Scale 3   Additional Treatment Session   Start Time 0802   End Time 0821   Treatment Assessment Patient participated in Skilled OT session this date with interventions consisting of ADL re training with the use of correct body mechnaics . Provided patient with handout on proper technique to maintain NWB  restrictions with UB dressing and instructions on how to yoana/doff sling. During session, pt doffed and donned sling with moderate assist and donned overhead T-shirt with moderate assist. Pt donned underwear and pants with moderate assist. Pt reports that wife will be assisting with ADLs PRN.  Patient demonstrated good understanding of technique/recommendations. Additionally, recommended patient take frequent rest breaks, break up extraneous activities as need, and sit during ADLs/IADLs if possible. Patient verbalized understanding and identified multiple activities that would need adjustment upon discharge. Patient demonstrated good awareness of deficits at this time. From OT standpoint, recommendation at time of d/c would be Level III (minimim resource intensity).   Patient to benefit from continued Occupational Therapy treatment while in the hospital to address deficits as defined above and maximize level of functional independence with ADLs and functional mobility.   End of Consult   Patient Position at End of Consult Bedside chair;Bed/Chair alarm activated;All needs within reach   Nurse Communication Nurse aware of consult     GOALS:    *ADL transfers with (I) for inc'd independence with ADLs/purposeful tasks    *UB ADL with (I) for inc'd independence with self cares    *LB ADL with (I) using AE prn for inc'd independence with self cares    *Toileting with (I) for clothing management and hygiene for return to PLOF with personal care    *Increase stand tolerance x 5  m for inc'd tolerance with standing purposeful tasks    *Participate in 10m UE therex to increase overall stamina/activity tolerance for purposeful tasks    *Bed mobility- (I) for inc'd independence to manage own comfort and initiate EOB & OOB purposeful tasks    *Patient will verbalize 3 safety awareness/ principles to prevent falls in the home setting.     *Patient will increase OOB/sitting tolerance to 2-4 hours per day to increase  participation in self-care and leisure tasks with no s/s of exertion.     *Pt will verbalize and demonstrate understanding of post-op movement precautions 100% in tx sessions for increased safety and functional mobility.      *Pt will demonstrate use of long handled AE during 100% of tx sessions for increased ADL safety and independence following D/C     QUE Mondragon, OTR/L

## 2024-05-30 NOTE — PLAN OF CARE
Problem: OCCUPATIONAL THERAPY ADULT  Goal: Performs self-care activities at highest level of function for planned discharge setting.  See evaluation for individualized goals.  Description: Treatment Interventions: ADL retraining, Functional transfer training, UE strengthening/ROM, Endurance training, Patient/family training, Equipment evaluation/education, Fine motor coordination activities, Compensatory technique education, Activityengagement          See flowsheet documentation for full assessment, interventions and recommendations.   Note: Limitation: Decreased ADL status, Decreased UE ROM, Decreased UE strength, Decreased endurance, Decreased self-care trans, Decreased high-level ADLs  Prognosis: Good  Assessment: Patient is a 73 y.o. male seen for OT evaluation s/p admit to [facility]  on 5/29/2024 w/S/P reverse total shoulder arthroplasty, right. Orders placed for OT evaluation and treatment and up as tolerated. Performed at least two patient identifiers during session including name and wristband.  Prior to admission, Patient reporting being independent with ADLs, ambulatory with no AD vs SPC, and lives with wife. Personal factors affecting patient at time of initial evaluation include: steps to enter, difficulty performing ADLs, and difficulty performing IADLs. Upon evaluation, patient requires moderate assist for UB ADLs, moderate assist for LB ADLs, transfers and functional ambulation in room and bathroom with contact guard assist, with the use of  no AD .  Patient is oriented x 4.  Occupational performance is affected by the following deficits: limited active ROM, decreased muscle strength, dynamic sit/ stand balance deficit with poor standing tolerance time for self care and functional mobility, decreased activity tolerance, orthopedic restrictions, and delayed righting and equilibrium reactions. Patient to benefit from continued Occupational Therapy treatment while in the hospital to  address deficits as defined above and maximize level of functional independence with ADLs and functional mobility. Occupational Performance areas to address include: eating, grooming , bathing/ shower, dressing, toilet hygiene, transfer to all surfaces, and functional ambulation. From OT standpoint, recommendation at time of d/c would be Level III (minimim resource intensity).     Rehab Resource Intensity Level, OT: III (Minimum Resource Intensity)        Sophia GREY, OTR/L

## 2024-05-30 NOTE — PHYSICAL THERAPY NOTE
"   PHYSICAL THERAPY EVALUATION  NAME:  Abundio Pederson  DATE: 05/30/24    AGE:   73 y.o.  Mrn:   [id number]  ADMIT DX:  Traumatic complete tear of right rotator cuff, subsequent encounter [S46.011D]  Problem List:   Patient Active Problem List   Diagnosis    Osteoarthritis of glenohumeral joint, right    Traumatic complete tear of right rotator cuff, subsequent encounter    S/P reverse total shoulder arthroplasty, right       Past Medical History  Past Medical History:   Diagnosis Date    Arthritis     Asthma     CAD (coronary artery disease)     CHF (congestive heart failure) ([facility])     COPD (chronic obstructive pulmonary disease) ([facility])     CPAP (continuous positive airway pressure) dependence     GERD (gastroesophageal reflux disease)     Heart attack ([facility])     2016    Hyperlipidemia     Hypertension     Myocardial infarction ([facility])     5/6/2016    Pneumonia     Sleep apnea        Past Surgical History  Past Surgical History:   Procedure Laterality Date    APPENDECTOMY      CARDIAC SURGERY  05/07/2016    COSMETIC SURGERY      \"face re built\"    HERNIA REPAIR      PA ARTHROPLASTY GLENOHUMERAL JOINT TOTAL SHOULDER Right 5/29/2024    Procedure: ARTHROPLASTY SHOULDER REVERSE;  Surgeon: Girish Ureña DO;  Location: [facility];  Service: Orthopedics       Length Of Stay: 0  Performed at least 2 patient identifiers during session: Name and Birthday       05/30/24 0748   PT Last Visit   PT Visit Date 05/30/24   Note Type   Note type Evaluation   Pain Assessment   Pain Assessment Tool 0-10   Pain Score No Pain  (pt stated no pain at rest or with mobility)   Pain Location/Orientation Orientation: Right;Location: Shoulder   Restrictions/Precautions   Weight Bearing Precautions Per Order Yes   RUE Weight Bearing Per Order NWB  (per ortho)   Braces or Orthoses Sling  (R shoulder sling with abduction pillow intact at all times per ortho)   Other Precautions Fall Risk;Fluid restriction;WBS  (RUE NWB with sling at all times) "   Home Living   Type of Home House   Home Layout Two level;Stairs to enter with rails;1/2 bath on main level  (1 ABEL. 11 steps to access second floor bedroom, pt states he is planning to stay on first floor with half bath and sleeping in recliner)   Bathroom Shower/Tub Tub/shower unit   Bathroom Toilet Raised   Bathroom Equipment Grab bars in shower;Grab bars around toilet;Shower chair  (shower chair in storage)   Bathroom Accessibility Accessible   Home Equipment Reacher;Cane;Quad cane;Walker   Additional Comments prn use of cane in community   Prior Function   Level of [place] Needs assistance with IADLS;Independent with ADLs;Independent with functional mobility  (A with IADLs since injuring shoulder 9/2023)   Lives With Spouse  (able to assist if needed)   Receives Help From Family  (daughter coming to stay for short time)   IADLs Family/Friend/Other provides transportation;Family/Friend/Other provides meals;Independent with medication management   Falls in the last 6 months 1 to 4  (2 per patient)   Vocational Full time employment  ()   Comments pt enjoys yard work   General   Additional Pertinent History pt POD#1 s/p Right reverse shoulder arthroplasty   Family/Caregiver Present No   Cognition   Overall Cognitive Status WFL   Arousal/Participation Alert   Orientation Level Oriented X4   Memory Within functional limits   Following Commands Follows all commands and directions without difficulty   Comments Pt agreeable to PT evaluation   RLE Assessment   RLE Assessment WFL   LLE Assessment   LLE Assessment WFL   Coordination   Sensation X  (pt reports intermitent R and L thumb numbness/tingling)   Bed Mobility   Supine to Sit 5  Supervision   Additional items HOB elevated;Increased time required   Sit to Supine   (not tested as pt seated in bedside chair at end of session)   Additional Comments Pt without complaints of lightheadedness, SOB, chest pain, dizziness throughout session   Transfers    Sit to Stand 5  Supervision   Additional items Increased time required;Verbal cues   Stand to Sit 5  Supervision   Additional items Increased time required;Verbal cues   Additional Comments no AD used during transfers   Ambulation/Elevation   Gait pattern Wide DENITA;Decreased foot clearance   Gait Assistance 5  Supervision   Additional items Assist x 1;Verbal cues   Assistive Device None  (pt declined trial of SPC)   Distance 250'   Stair Management Assistance   (CGA)   Additional items Assist x 1;Verbal cues   Stair Management Technique One rail L;Alternating pattern;Step to pattern  (unilateral HR with LUE)   Number of Stairs 4   Ambulation/Elevation Additional Comments pt educated in step to step pattern for stair negotiation for safety. no overt LOB in any direction during ambulation/stair training   Balance   Static Sitting Normal   Dynamic Sitting Good   Static Standing Fair +   Dynamic Standing Fair +   Ambulatory Fair +   Activity Tolerance   Activity Tolerance Patient tolerated treatment well   Medical Staff Made Aware Pt seen as a co-eval with OT Sophia and OT student Herminia due to the patient's co-morbidities, clinical presentation, and present impairments which are a regression from the patient's baseline.   Nurse Made Aware JOS G[place]   Assessment   Prognosis Excellent   Problem List Decreased strength;Decreased range of motion;Impaired balance;Orthopedic restrictions   Assessment Pt is 73 y.o. male seen for moderate-complexity PT evaluation on 5/30/2024 s/p admit to [facility] on 5/29/2024 w/ S/P reverse total shoulder arthroplasty, right. PT was consulted to assess pt's functional mobility and d/c needs. Order placed for PT eval and tx, w/  out of bed  order. Pt POD#1 s/p Right reverse shoulder arthroplasty; RUE NWB with shoulder sling and abduction pillow in place at all times. PTA, pt was living with his spouse in a two story home with 1 ABEL and 11 steps to second floor. Pt reports  independence at baseline with ADLs and functional mobility with prn use of SPC in community. At time of eval, patient completed sup > sit supervision with HOB elevated, STS supervision, and ambulated 250' supervision without AD and without LOB in any direction. Pt navigated 4 steps with unilateral HR in LUE CGA and without LOB in any direction. Upon evaluation, pt presenting with impaired functional mobility d/t decreased strength, decreased ROM, decreased endurance, impaired balance, decreased mobility, and orthopedic restrictions of RUE NWB in sling at all times . Pertinent PMHx and current co-morbidities affecting pt's physical performance at time of assessment include: traumatic complete tear of right rotator cuff, s/p R reverse TSA, CHF, HTN. Personal factors affecting pt at time of eval include: lives in 2 story house, stairs to enter home, and positive fall history. The following objective measures performed on IE also reveal limitations: AM-PAC 6-Clicks: 19/24. Pt's clinical presentation is currently evolving seen in pt's presentation of advanced age, need for input for task focus and mobility technique, need for supervision to Adela assist w/ all phases of mobility when usually mobilizing independently, and ongoing medical assessment. Overall, pt's rehab potential and prognosis to return to PLOF is excellent as impacted by objective findings, warranting pt to receive further skilled PT interventions to address impairments, activity limitations, and participation restrictions. Pt to benefit from continued PT services to address deficits as defined above and maximize level of functional independent mobility and consistency. From PT/mobility standpoint, recommendation at time of d/c would be level 3, minimal resource intensity in order to facilitate return to PLOF.   Goals   [facility] Expiration Date 06/09/24   Short Term Goal #1 In 10 days: Perform all bed mobility tasks modified independent to decrease caregiver  burden, Perform all transfers modified independent to improve independence, Ambulate > 250 ft. with least restrictive assistive device modified independent w/o LOB and w/ normalized gait pattern 100% of the time, Navigate 11 stairs modified independent with unilateral handrail to facilitate return to previous living environment, and Increase all balance 1/2 grade to decrease risk for falls   PT Treatment Day 0   Plan   Treatment/Interventions Functional transfer training;Elevations;Therapeutic exercise;Endurance training;Patient/family training;Gait training;Bed mobility;Spoke to nursing;OT   PT Frequency 2-3x/wk   Discharge Recommendation   Rehab Resource Intensity Level, PT III (Minimum Resource Intensity)  (once cleared by orthopedics for outpatient PT)   AM-PAC Basic Mobility Inpatient   Turning in Flat Bed Without Bedrails 4   Lying on Back to Sitting on Edge of Flat Bed Without Bedrails 3   Moving Bed to Chair 3   Standing Up From Chair Using Arms 3   Walk in Room 3   Climb 3-5 Stairs With Railing 3   Basic Mobility Inpatient Raw Score 19   Basic Mobility Standardized Score 42.48   [facility] Highest Level Of Mobility   -HLM Goal 6: Walk 10 steps or more   -HLM Achieved 8: Walk 250 feet ot more   End of Consult   Patient Position at End of Consult All needs within reach;Bed/Chair alarm activated;Bedside chair       Time In: 0748  Time Out: 0801  Total Evaluation Minutes: 13    Palak Jon, PT

## 2024-05-30 NOTE — PLAN OF CARE
Problem: PHYSICAL THERAPY ADULT  Goal: Performs mobility at highest level of function for planned discharge setting.  See evaluation for individualized goals.  Description: Treatment/Interventions: Functional transfer training, Elevations, Therapeutic exercise, Endurance training, Patient/family training, Gait training, Bed mobility, Spoke to nursing, OT          See flowsheet documentation for full assessment, interventions and recommendations.  5/30/2024 0822 by Palak Jon PT  Note: Prognosis: Excellent  Problem List: Decreased strength, Decreased range of motion, Impaired balance, Orthopedic restrictions  Assessment: Pt is 73 y.o. male seen for moderate-complexity PT evaluation on 5/30/2024 s/p admit to [facility] on 5/29/2024 w/ S/P reverse total shoulder arthroplasty, right. PT was consulted to assess pt's functional mobility and d/c needs. Order placed for PT eval and tx, w/  out of bed  order. Pt POD#1 s/p Right reverse shoulder arthroplasty; RUE NWB with shoulder sling and abduction pillow in place at all times. PTA, pt was living with his spouse in a two story home with 1 ABEL and 11 steps to second floor. Pt reports independence at baseline with ADLs and functional mobility with prn use of SPC in community. At time of eval, patient completed sup > sit supervision with HOB elevated, STS supervision, and ambulated 250' supervision without AD and without LOB in any direction. Pt navigated 4 steps with unilateral HR in LUE CGA and without LOB in any direction. Upon evaluation, pt presenting with impaired functional mobility d/t decreased strength, decreased ROM, decreased endurance, impaired balance, decreased mobility, and orthopedic restrictions of RUE NWB in sling at all times . Pertinent PMHx and current co-morbidities affecting pt's physical performance at time of assessment include: traumatic complete tear of right rotator cuff, s/p R reverse TSA, CHF, HTN. Personal factors  affecting pt at time of eval include: lives in 2 story house, stairs to enter home, and positive fall history. The following objective measures performed on IE also reveal limitations: AM-PAC 6-Clicks: 19/24. Pt's clinical presentation is currently evolving seen in pt's presentation of advanced age, need for input for task focus and mobility technique, need for supervision to Adela assist w/ all phases of mobility when usually mobilizing independently, and ongoing medical assessment. Overall, pt's rehab potential and prognosis to return to PLOF is excellent as impacted by objective findings, warranting pt to receive further skilled PT interventions to address impairments, activity limitations, and participation restrictions. Pt to benefit from continued PT services to address deficits as defined above and maximize level of functional independent mobility and consistency. From PT/mobility standpoint, recommendation at time of d/c would be level 3, minimal resource intensity in order to facilitate return to PLOF.        Rehab Resource Intensity Level, PT: III (Minimum Resource Intensity) (once cleared by orthopedics for outpatient PT)    See flowsheet documentation for full assessment.   Palak Jon; Pt, DPT

## 2024-06-07 ENCOUNTER — TELEPHONE (OUTPATIENT)
Dept: OBGYN CLINIC | Facility: CLINIC | Age: 74
End: 2024-06-07

## 2024-06-10 ENCOUNTER — OFFICE VISIT (OUTPATIENT)
Dept: OBGYN CLINIC | Facility: CLINIC | Age: 74
End: 2024-06-10

## 2024-06-10 ENCOUNTER — APPOINTMENT (OUTPATIENT)
Dept: RADIOLOGY | Facility: CLINIC | Age: 74
End: 2024-06-10
Payer: COMMERCIAL

## 2024-06-10 VITALS
BODY MASS INDEX: 35 KG/M2 | HEIGHT: 71 IN | DIASTOLIC BLOOD PRESSURE: 66 MMHG | HEART RATE: 68 BPM | SYSTOLIC BLOOD PRESSURE: 109 MMHG | WEIGHT: 250 LBS

## 2024-06-10 DIAGNOSIS — Z48.89 AFTERCARE FOLLOWING SURGERY: Primary | ICD-10-CM

## 2024-06-10 DIAGNOSIS — Z48.89 AFTERCARE FOLLOWING SURGERY: ICD-10-CM

## 2024-06-10 PROCEDURE — 99024 POSTOP FOLLOW-UP VISIT: CPT | Performed by: ORTHOPAEDIC SURGERY

## 2024-06-10 PROCEDURE — 73030 X-RAY EXAM OF SHOULDER: CPT

## 2024-06-10 RX ORDER — POTASSIUM CHLORIDE 20 MEQ/1
20 TABLET, EXTENDED RELEASE ORAL DAILY
COMMUNITY
Start: 2024-06-02

## 2024-06-10 NOTE — PROGRESS NOTES
Patient Name:  Abundio Pederson  MRN:  [id number]    Assessment & Plan     1. Aftercare following surgery  -     XR shoulder 2+ vw right; Future; Expected date: 06/10/2024  -     Ambulatory Referral to Physical Therapy; Future    Approximately 12 day s/p Right reverse shoulder arthroplasty performed on 05/29/2024  X-rays reviewed with patient and family  Overall, patient doing well s/p surgical intervention  Advised patient to maintain nonweightbearing status Right upper extremity and use of sling for a total of 4 weeks post operatively, and continue 2 additional weeks when out of the house. Caution with pushing up off surfaces when sitting.   PT script was placed today and provided post op protocol as patient attends to therapy outside [facility]  Reviewed home exercises including elbow ROM, scapular retractions, and pendulums  Continue OTC medication as needed for pain relief  Follow up in 4 weeks for reevaluation of Right shoulder and new x-rays upon arrival      History of the Present Illness   Abundio Pederson is a 73 y.o. male approximately 12 day s/p Right reverse shoulder arthroplasty performed on 05/29/2024. Today, patient reports he is doing well s/p surgical intervention. He admits he only has shoulder pain when the arm is unsupported. He admits the bilateral wrists don't have much motion at baseline. He does have some pain in the biceps.         Review of Systems     Review of Systems   Constitutional:  Negative for chills and fever.   HENT:  Negative for ear pain and sore throat.    Eyes:  Negative for pain and visual disturbance.   Respiratory:  Negative for cough and shortness of breath.    Cardiovascular:  Negative for chest pain and palpitations.   Gastrointestinal:  Negative for abdominal pain and vomiting.   Genitourinary:  Negative for dysuria and hematuria.   Musculoskeletal:  Negative for arthralgias and back pain.   Skin:  Negative for color change and rash.   Neurological:  Negative for seizures  "and syncope.   All other systems reviewed and are negative.      Physical Exam     /66   Pulse 68   Ht 5' 11\" (1.803 m)   Wt 113 kg (250 lb)   BMI 34.87 kg/m²     Right Shoulder:   Surgical incision well healing  Elbow range of motion  degrees with discomfort during attempted elbow extension  Full composite fist   Axillary sensation intact  Passive shoulder ROM to approximately 80 degrees without pain  The patient is neurovascularly intact distally in the extremity.      Data Review     I have personally reviewed pertinent films in PACS, and my interpretation follows.    X-rays taken 06/10/2024 of Right shoulder independently reviewed and demonstrate reverse shoulder prostehsis in appropriate alignemtn without signs of fracture, loosening or lucency.    Social History     Tobacco Use    Smoking status: Former     Types: Cigarettes    Smokeless tobacco: Never    Tobacco comments:     Quit 1991   Vaping Use    Vaping status: Never Used   Substance Use Topics    Alcohol use: Not Currently    Drug use: Never           Procedures  None     Kanchan Sands PA-C     "

## 2024-06-11 ENCOUNTER — TELEPHONE (OUTPATIENT)
Age: 74
End: 2024-06-11

## 2024-06-11 NOTE — TELEPHONE ENCOUNTER
Caller: Krystle Longoria PT    Doctor: Niranjan    Reason for call: Please fax PT RX.    Call back#: [phone]    Fax# [phone]

## 2024-06-13 DIAGNOSIS — Z96.611 S/P REVERSE TOTAL SHOULDER ARTHROPLASTY, RIGHT: Primary | ICD-10-CM

## 2024-06-13 NOTE — TELEPHONE ENCOUNTER
Caller: Moses PT    Doctor: Niranjan    Reason for call: PT needs order that has the Dx and Duration on the order. They stated that the previous one faxed was not the one they needed.    FAX: [phone]

## 2024-06-18 NOTE — TELEPHONE ENCOUNTER
Caller: Poly DUBON     Doctor: Niranjan     Reason for call: Asked for PT script to be faxed and asked for protocol   Fax- [phone]

## 2024-06-20 ENCOUNTER — TELEPHONE (OUTPATIENT)
Age: 74
End: 2024-06-20

## 2024-06-20 NOTE — TELEPHONE ENCOUNTER
Caller: Gale lea    Doctor/Office: Niranjan RUTHERFORD#: n/a      What needs to be faxed: 6/10 LEONOR    ATTN to: [employer]    Fax#: [phone]       Documents were successfully e-faxed

## 2024-07-08 ENCOUNTER — APPOINTMENT (OUTPATIENT)
Dept: RADIOLOGY | Facility: CLINIC | Age: 74
End: 2024-07-08
Payer: COMMERCIAL

## 2024-07-08 ENCOUNTER — OFFICE VISIT (OUTPATIENT)
Dept: OBGYN CLINIC | Facility: CLINIC | Age: 74
End: 2024-07-08

## 2024-07-08 VITALS
HEIGHT: 71 IN | DIASTOLIC BLOOD PRESSURE: 77 MMHG | SYSTOLIC BLOOD PRESSURE: 114 MMHG | HEART RATE: 80 BPM | WEIGHT: 256.6 LBS | BODY MASS INDEX: 35.92 KG/M2

## 2024-07-08 DIAGNOSIS — Z48.89 AFTERCARE FOLLOWING SURGERY: ICD-10-CM

## 2024-07-08 DIAGNOSIS — Z96.611 S/P REVERSE TOTAL SHOULDER ARTHROPLASTY, RIGHT: ICD-10-CM

## 2024-07-08 DIAGNOSIS — Z96.611 S/P REVERSE TOTAL SHOULDER ARTHROPLASTY, RIGHT: Primary | ICD-10-CM

## 2024-07-08 PROCEDURE — 73030 X-RAY EXAM OF SHOULDER: CPT

## 2024-07-08 PROCEDURE — 99024 POSTOP FOLLOW-UP VISIT: CPT | Performed by: ORTHOPAEDIC SURGERY

## 2024-07-08 NOTE — LETTER
July 8, 2024     Patient: Abundio Pederson  YOB: 1950  Date of Visit: 7/8/2024      To Whom it May Concern:    Abundio Pederson is under my professional care. Abundio was seen in my office on 7/8/2024. Abundio will remain out of work at this time. He will be reevaluated again in approximately 6 weeks and a new note may be provided at that time.    If you have any questions or concerns, please don't hesitate to call.         Sincerely,          Girish Ureña,         CC: No Recipients

## 2024-07-08 NOTE — PROGRESS NOTES
"Patient Name:  Abundio Pederson  MRN:  [id number]    Assessment & Plan     1. S/P reverse total shoulder arthroplasty, right  -     XR shoulder 2+ vw right; Future; Expected date: 07/08/2024  2. Aftercare following surgery  -     XR shoulder 2+ vw right; Future; Expected date: 07/08/2024    Approximately 6 weeks s/p Right reverse shoulder arthroplasty performed on 05/29/2024  X-rays obtained and reviewed in the office today  May discontinue sling at this time  No lifting at this time. May incorporate right shoulder into ADLs  May begin driving. Begin in an empty parking lot and progress as comfortable  Continue physical therapy  No work at this time. A note was provided  Follow up 6  weeks for reevaluation and new left shoulder x-rays    History of the Present Illness   Abundio Pederson is a 73 y.o. male approximately 6 weeks s/p Right reverse shoulder arthroplasty performed on 05/29/2024. Today, patient reports he is doing well s/p surgical intervention. The patient continues physical therapy. He has been compliant wearing his sling.        Review of Systems     Review of Systems   Constitutional:  Negative for chills and fever.   HENT:  Negative for ear pain and sore throat.    Eyes:  Negative for pain and visual disturbance.   Respiratory:  Negative for cough and shortness of breath.    Cardiovascular:  Negative for chest pain and palpitations.   Gastrointestinal:  Negative for abdominal pain and vomiting.   Genitourinary:  Negative for dysuria and hematuria.   Musculoskeletal:  Negative for arthralgias and back pain.   Skin:  Negative for color change and rash.   Neurological:  Negative for seizures and syncope.   All other systems reviewed and are negative.      Physical Exam     /77   Pulse 80   Ht 5' 11\" (1.803 m)   Wt 116 kg (256 lb 9.6 oz)   BMI 35.79 kg/m²     Right Shoulder:   Surgical incision well healing  Active forward flexion to 45 degrees  Passive forward flexion to 95 degrees  Elbow range of " motion  degrees  Full composite fist   Axillary sensation intact  Passive shoulder ROM to approximately 80 degrees without pain  The patient is neurovascularly intact distally in the extremity.      Data Review     I have personally reviewed pertinent films in PACS, and my interpretation follows.    X-rays taken 07/08/2024 of Right shoulder independently reviewed and demonstrate reverse shoulder prostehsis in appropriate alignment without signs of fracture, loosening or lucency.    Social History     Tobacco Use   • Smoking status: Former     Types: Cigarettes   • Smokeless tobacco: Never   • Tobacco comments:     Quit 1991   Vaping Use   • Vaping status: Never Used   Substance Use Topics   • Alcohol use: Not Currently   • Drug use: Never           Procedures  None     Codie Arevalo   Scribe Attestation    I,:  Codie Arevalo am acting as a scribe while in the presence of the attending physician.:       I,:  Girish Ureña, DO personally performed the services described in this documentation    as scribed in my presence.:

## 2024-07-19 ENCOUNTER — TELEPHONE (OUTPATIENT)
Age: 74
End: 2024-07-19

## 2024-07-19 NOTE — TELEPHONE ENCOUNTER
Caller: Gale Sanchez     Doctor/Office: Niranjan RUTHERFORD#: [phone]      What needs to be faxed: LEONOR 7/8 & Work Status    ATTN to: [employer]    Fax#: [phone]      Documents were successfully e-faxed

## 2024-07-23 ENCOUNTER — TELEPHONE (OUTPATIENT)
Age: 74
End: 2024-07-23

## 2024-07-23 DIAGNOSIS — Z96.611 S/P REVERSE TOTAL SHOULDER ARTHROPLASTY, RIGHT: Primary | ICD-10-CM

## 2024-07-23 NOTE — TELEPHONE ENCOUNTER
Caller: antonia    Doctor: Niranjan    Reason for call: needs updated PT referral faxed to [phone]    Call back#: [phone]

## 2024-08-19 ENCOUNTER — OFFICE VISIT (OUTPATIENT)
Dept: OBGYN CLINIC | Facility: CLINIC | Age: 74
End: 2024-08-19

## 2024-08-19 ENCOUNTER — APPOINTMENT (OUTPATIENT)
Dept: RADIOLOGY | Facility: CLINIC | Age: 74
End: 2024-08-19
Payer: COMMERCIAL

## 2024-08-19 VITALS
HEART RATE: 78 BPM | BODY MASS INDEX: 35.79 KG/M2 | DIASTOLIC BLOOD PRESSURE: 72 MMHG | HEIGHT: 71 IN | SYSTOLIC BLOOD PRESSURE: 125 MMHG

## 2024-08-19 DIAGNOSIS — Z96.611 S/P REVERSE TOTAL SHOULDER ARTHROPLASTY, RIGHT: Primary | ICD-10-CM

## 2024-08-19 DIAGNOSIS — Z48.89 AFTERCARE FOLLOWING SURGERY: ICD-10-CM

## 2024-08-19 DIAGNOSIS — Z96.611 S/P REVERSE TOTAL SHOULDER ARTHROPLASTY, RIGHT: ICD-10-CM

## 2024-08-19 PROCEDURE — 73030 X-RAY EXAM OF SHOULDER: CPT

## 2024-08-19 PROCEDURE — 99024 POSTOP FOLLOW-UP VISIT: CPT | Performed by: ORTHOPAEDIC SURGERY

## 2024-08-19 NOTE — LETTER
August 19, 2024     Patient: Abundio Pederson  YOB: 1950  Date of Visit: 8/19/2024      To Whom it May Concern:    Abundio Pederson is under my professional care. Abundio was seen in my office on 8/19/2024. Abundio will remain out of work at this time. He will be reevaluated again in approximately 6 weeks and a new note may be provided at that time.    If you have any questions or concerns, please don't hesitate to call.         Sincerely,          Girish Ureña,         CC: No Recipients

## 2024-08-19 NOTE — PROGRESS NOTES
"Patient Name:  Abundio Pederson  MRN:  [id number]    Assessment & Plan     1. S/P reverse total shoulder arthroplasty, right  -     XR shoulder 2+ vw right; Future; Expected date: 08/19/2024  2. Aftercare following surgery    Approximately 12 weeks s/p Right reverse shoulder arthroplasty performed on 05/29/2024  X-rays obtained and reviewed in the office today  Continue home exercises as directed  Continue physical therapy, progress as tolerated  OTC analgesics as needed  No work at this time. Work note provided  Follow up 6 weeks for range of motion check    History of the Present Illness   Abundio Pederson is a 73 y.o. male approximately 12 weeks s/p Right reverse shoulder arthroplasty performed on 05/29/2024. Today, patient reports he is doing well s/p surgical intervention. He is not having any pain today. He continues physical therapy, but admits he has not been doing much strengthening with PT. He is pleased with his progress so far.         Review of Systems     Review of Systems   Constitutional:  Negative for chills and fever.   HENT:  Negative for ear pain and sore throat.    Eyes:  Negative for pain and visual disturbance.   Respiratory:  Negative for cough and shortness of breath.    Cardiovascular:  Negative for chest pain and palpitations.   Gastrointestinal:  Negative for abdominal pain and vomiting.   Genitourinary:  Negative for dysuria and hematuria.   Musculoskeletal:  Negative for arthralgias and back pain.   Skin:  Negative for color change and rash.   Neurological:  Negative for seizures and syncope.   All other systems reviewed and are negative.      Physical Exam     /72   Pulse 78   Ht 5' 11\" (1.803 m)   BMI 35.79 kg/m²     Right Shoulder:   Surgical incision well healing  Active forward flexion to 70 degrees  Abduction to 80 degrees  External range of motion 10  Full composite fist   Axillary sensation intact  Passive shoulder forward flexion to approximately 100 degrees without " pain  The patient is neurovascularly intact distally in the extremity.      Data Review     I have personally reviewed pertinent films in PACS, and my interpretation follows.    X-rays taken 08/19/2024 of Right shoulder independently reviewed and demonstrate reverse shoulder prostehsis in appropriate alignment without signs of fracture, loosening or lucency.    Social History     Tobacco Use   • Smoking status: Former     Types: Cigarettes   • Smokeless tobacco: Never   • Tobacco comments:     Quit 1991   Vaping Use   • Vaping status: Never Used   Substance Use Topics   • Alcohol use: Not Currently   • Drug use: Never           Procedures  None     Codie Arevalo   Scribe Attestation    I,:  Codie Arevalo am acting as a scribe while in the presence of the attending physician.:       I,:  Girish Ureña, DO personally performed the services described in this documentation    as scribed in my presence.:

## 2024-09-10 ENCOUNTER — TELEPHONE (OUTPATIENT)
Dept: OBGYN CLINIC | Facility: HOSPITAL | Age: 74
End: 2024-09-10

## 2024-09-10 NOTE — TELEPHONE ENCOUNTER
Caller: Johana (Spouse)    Doctor: Niranjan    Reason for call: Needs updated PT script for more visits faxed to   Att: [facility] Fax# [phone].    Call back#:  [phone]

## 2024-09-13 DIAGNOSIS — Z96.611 S/P REVERSE TOTAL SHOULDER ARTHROPLASTY, RIGHT: Primary | ICD-10-CM

## 2024-10-09 ENCOUNTER — TELEPHONE (OUTPATIENT)
Dept: OBGYN CLINIC | Facility: CLINIC | Age: 74
End: 2024-10-09

## 2024-10-14 ENCOUNTER — TELEPHONE (OUTPATIENT)
Age: 74
End: 2024-10-14

## 2024-10-14 NOTE — TELEPHONE ENCOUNTER
Caller: [email or web address] from w/c    Doctor: Kip    Reason for call: Requesting AVS & work status note from 08/19 to be faxed to [phone]    Call back#: [phone]

## (undated) DEVICE — IMPERVIOUS STOCKINETTE: Brand: DEROYAL

## (undated) DEVICE — DRAPE EQUIPMENT RF WAND

## (undated) DEVICE — 3M™ STERI-DRAPE™ U-DRAPE 1015: Brand: STERI-DRAPE™

## (undated) DEVICE — 4-PORT MANIFOLD: Brand: NEPTUNE 2

## (undated) DEVICE — NEPTUNE E-SEP SMOKE EVACUATION PENCIL, COATED, 70MM BLADE, PUSH BUTTON SWITCH: Brand: NEPTUNE E-SEP

## (undated) DEVICE — SPONGE LAP 18 X 18 IN

## (undated) DEVICE — DRESSING MEPILEX AG BORDER POST-OP 4 X 6 IN

## (undated) DEVICE — GUIDE PIN 2.5 X 220MM AEQUALIS PERFORM PLUS

## (undated) DEVICE — CHLORAPREP HI-LITE 26ML ORANGE

## (undated) DEVICE — DRILL BIT 3.2MM

## (undated) DEVICE — 3M™ STERI-STRIP™ REINFORCED ADHESIVE SKIN CLOSURES, R1547, 1/2 IN X 4 IN (12 MM X 100 MM), 6 STRIPS/ENVELOPE: Brand: 3M™ STERI-STRIP™

## (undated) DEVICE — GLOVE INDICATOR PI UNDERGLOVE SZ 7.5 BLUE

## (undated) DEVICE — DRESSING MEPILEX AG BORDER 4 X 8 IN

## (undated) DEVICE — HOOD: Brand: FLYTE, SURGICOOL

## (undated) DEVICE — ELECTRODE BLADE E-Z CLEAN 6.5IN -0014

## (undated) DEVICE — HEAVY DUTY TABLE COVER: Brand: CONVERTORS

## (undated) DEVICE — GLOVE SRG BIOGEL ORTHOPEDIC 7.5

## (undated) DEVICE — SUT VICRYL 2-0 CT-1 27 IN J259H

## (undated) DEVICE — MAYO STAND COVER: Brand: CONVERTORS

## (undated) DEVICE — HANDPIECE SET WITH RETRACTABLE COAXIAL FAN SPRAY TIP AND SUCTION TUBE: Brand: INTERPULSE

## (undated) DEVICE — PACK MAJOR ORTHO W/SPLITS PBDS

## (undated) DEVICE — BLADE SAGITTAL 25.6 X 9.5MM

## (undated) DEVICE — SUT FIBERWIRE #2 1/2 CIRCLE T-5 38IN AR-7200

## (undated) DEVICE — SUT VICRYL 0 CT-1 27 IN J260H

## (undated) DEVICE — SCREW PERIPHERAL 5 X 14MM: Type: IMPLANTABLE DEVICE | Site: SHOULDER | Status: NON-FUNCTIONAL